# Patient Record
Sex: FEMALE | Race: WHITE | Employment: FULL TIME | URBAN - NONMETROPOLITAN AREA
[De-identification: names, ages, dates, MRNs, and addresses within clinical notes are randomized per-mention and may not be internally consistent; named-entity substitution may affect disease eponyms.]

---

## 2017-08-15 ENCOUNTER — HOSPITAL ENCOUNTER (OUTPATIENT)
Dept: ULTRASOUND IMAGING | Age: 27
Discharge: HOME | End: 2017-08-15
Attending: FAMILY MEDICINE | Admitting: FAMILY MEDICINE

## 2017-08-15 DIAGNOSIS — R10.9 ABDOMINAL PAIN, UNSPECIFIED LOCATION: ICD-10-CM

## 2017-09-13 ENCOUNTER — HOSPITAL ENCOUNTER (OUTPATIENT)
Dept: NUCLEAR MEDICINE | Age: 27
Discharge: HOME | End: 2017-09-13
Attending: FAMILY MEDICINE | Admitting: FAMILY MEDICINE

## 2017-09-13 DIAGNOSIS — R10.9 ABDOMINAL PAIN, UNSPECIFIED SITE: ICD-10-CM

## 2017-09-13 DIAGNOSIS — R10.9 ABDOMINAL PAIN: ICD-10-CM

## 2017-09-13 RX ADMIN — Medication 6 MILLICURIE: at 12:46

## 2019-01-02 ENCOUNTER — HOSPITAL ENCOUNTER (EMERGENCY)
Age: 29
Discharge: HOME | End: 2019-01-02

## 2019-01-02 VITALS
RESPIRATION RATE: 16 BRPM | SYSTOLIC BLOOD PRESSURE: 147 MMHG | WEIGHT: 209 LBS | HEART RATE: 77 BPM | OXYGEN SATURATION: 100 % | TEMPERATURE: 97.9 F | BODY MASS INDEX: 37.03 KG/M2 | HEIGHT: 63 IN | DIASTOLIC BLOOD PRESSURE: 97 MMHG

## 2019-01-02 DIAGNOSIS — G44.209 ACUTE NON INTRACTABLE TENSION-TYPE HEADACHE: Primary | ICD-10-CM

## 2019-01-02 DIAGNOSIS — R03.0 ELEVATED BLOOD PRESSURE READING: ICD-10-CM

## 2019-01-02 PROCEDURE — 96374 THER/PROPH/DIAG INJ IV PUSH: CPT

## 2019-01-02 PROCEDURE — 2580000003 HC RX 258: Performed by: PHYSICIAN ASSISTANT

## 2019-01-02 PROCEDURE — 96361 HYDRATE IV INFUSION ADD-ON: CPT

## 2019-01-02 PROCEDURE — 6360000002 HC RX W HCPCS: Performed by: PHYSICIAN ASSISTANT

## 2019-01-02 PROCEDURE — 96375 TX/PRO/DX INJ NEW DRUG ADDON: CPT

## 2019-01-02 PROCEDURE — 99283 EMERGENCY DEPT VISIT LOW MDM: CPT

## 2019-01-02 PROCEDURE — 6370000000 HC RX 637 (ALT 250 FOR IP): Performed by: PHYSICIAN ASSISTANT

## 2019-01-02 RX ORDER — 0.9 % SODIUM CHLORIDE 0.9 %
1000 INTRAVENOUS SOLUTION INTRAVENOUS ONCE
Status: COMPLETED | OUTPATIENT
Start: 2019-01-02 | End: 2019-01-02

## 2019-01-02 RX ORDER — METOCLOPRAMIDE HYDROCHLORIDE 5 MG/ML
10 INJECTION INTRAMUSCULAR; INTRAVENOUS ONCE
Status: COMPLETED | OUTPATIENT
Start: 2019-01-02 | End: 2019-01-02

## 2019-01-02 RX ORDER — DIPHENHYDRAMINE HCL 25 MG
50 TABLET ORAL ONCE
Status: COMPLETED | OUTPATIENT
Start: 2019-01-02 | End: 2019-01-02

## 2019-01-02 RX ORDER — KETOROLAC TROMETHAMINE 30 MG/ML
30 INJECTION, SOLUTION INTRAMUSCULAR; INTRAVENOUS ONCE
Status: COMPLETED | OUTPATIENT
Start: 2019-01-02 | End: 2019-01-02

## 2019-01-02 RX ORDER — BUTALBITAL, ASPIRIN, AND CAFFEINE 50; 325; 40 MG/1; MG/1; MG/1
1 CAPSULE ORAL EVERY 6 HOURS PRN
Qty: 20 CAPSULE | Refills: 0 | Status: SHIPPED | OUTPATIENT
Start: 2019-01-02 | End: 2022-08-01

## 2019-01-02 RX ADMIN — SODIUM CHLORIDE 1000 ML: 9 INJECTION, SOLUTION INTRAVENOUS at 14:31

## 2019-01-02 RX ADMIN — KETOROLAC TROMETHAMINE 30 MG: 30 INJECTION, SOLUTION INTRAMUSCULAR; INTRAVENOUS at 14:31

## 2019-01-02 RX ADMIN — METOCLOPRAMIDE 10 MG: 5 INJECTION, SOLUTION INTRAMUSCULAR; INTRAVENOUS at 14:32

## 2019-01-02 RX ADMIN — DIPHENHYDRAMINE HCL 50 MG: 25 TABLET ORAL at 14:31

## 2019-01-02 ASSESSMENT — PAIN DESCRIPTION - LOCATION: LOCATION: HEAD

## 2019-01-02 ASSESSMENT — PAIN DESCRIPTION - PAIN TYPE: TYPE: ACUTE PAIN

## 2019-01-02 ASSESSMENT — PAIN SCALES - GENERAL: PAINLEVEL_OUTOF10: 7

## 2019-11-05 ENCOUNTER — APPOINTMENT (OUTPATIENT)
Dept: ULTRASOUND IMAGING | Age: 29
End: 2019-11-05
Payer: MEDICAID

## 2019-11-05 ENCOUNTER — HOSPITAL ENCOUNTER (EMERGENCY)
Age: 29
Discharge: HOME | End: 2019-11-05
Attending: EMERGENCY MEDICINE
Payer: MEDICAID

## 2019-11-05 VITALS
DIASTOLIC BLOOD PRESSURE: 93 MMHG | WEIGHT: 215 LBS | TEMPERATURE: 98.7 F | HEART RATE: 92 BPM | RESPIRATION RATE: 15 BRPM | HEIGHT: 63 IN | BODY MASS INDEX: 38.09 KG/M2 | OXYGEN SATURATION: 100 % | SYSTOLIC BLOOD PRESSURE: 157 MMHG

## 2019-11-05 DIAGNOSIS — R10.9 ABDOMINAL PAIN DURING PREGNANCY IN FIRST TRIMESTER: Primary | ICD-10-CM

## 2019-11-05 DIAGNOSIS — O26.891 ABDOMINAL PAIN DURING PREGNANCY IN FIRST TRIMESTER: Primary | ICD-10-CM

## 2019-11-05 LAB
ABO/RH: NORMAL
ALBUMIN SERPL-MCNC: 4.1 GM/DL (ref 3.4–5)
ALP BLD-CCNC: 101 IU/L (ref 40–129)
ALT SERPL-CCNC: 14 U/L (ref 10–40)
ANION GAP SERPL CALCULATED.3IONS-SCNC: 10 MMOL/L (ref 4–16)
AST SERPL-CCNC: 13 IU/L (ref 15–37)
BACTERIA: ABNORMAL /HPF
BASOPHILS ABSOLUTE: 0.1 K/CU MM
BASOPHILS RELATIVE PERCENT: 0.7 % (ref 0–1)
BILIRUB SERPL-MCNC: 0.7 MG/DL (ref 0–1)
BILIRUBIN URINE: NEGATIVE MG/DL
BLOOD, URINE: NEGATIVE
BUN BLDV-MCNC: 10 MG/DL (ref 6–23)
CALCIUM SERPL-MCNC: 9.3 MG/DL (ref 8.3–10.6)
CAST TYPE: ABNORMAL /HPF
CHLORIDE BLD-SCNC: 104 MMOL/L (ref 99–110)
CLARITY: ABNORMAL
CO2: 23 MMOL/L (ref 21–32)
COLOR: YELLOW
CREAT SERPL-MCNC: 0.7 MG/DL (ref 0.6–1.1)
CRYSTAL TYPE: ABNORMAL /HPF
DIFFERENTIAL TYPE: ABNORMAL
EOSINOPHILS ABSOLUTE: 0 K/CU MM
EOSINOPHILS RELATIVE PERCENT: 0.6 % (ref 0–3)
EPITHELIAL CELLS, UA: ABNORMAL /HPF
GFR AFRICAN AMERICAN: >60 ML/MIN/1.73M2
GFR NON-AFRICAN AMERICAN: >60 ML/MIN/1.73M2
GLUCOSE BLD-MCNC: 89 MG/DL (ref 70–99)
GLUCOSE, URINE: NEGATIVE MG/DL
GONADOTROPIN, CHORIONIC (HCG) QUANT: NORMAL UIU/ML
HCT VFR BLD CALC: 38.1 % (ref 37–47)
HEMOGLOBIN: 12.2 GM/DL (ref 12.5–16)
IMMATURE NEUTROPHIL %: 0.1 % (ref 0–0.43)
KETONES, URINE: ABNORMAL MG/DL
LEUKOCYTE ESTERASE, URINE: ABNORMAL
LIPASE: 11 IU/L (ref 13–60)
LYMPHOCYTES ABSOLUTE: 1.4 K/CU MM
LYMPHOCYTES RELATIVE PERCENT: 21.4 % (ref 24–44)
Lab: ABNORMAL
Lab: NORMAL
MCH RBC QN AUTO: 28.6 PG (ref 27–31)
MCHC RBC AUTO-ENTMCNC: 32 % (ref 32–36)
MCV RBC AUTO: 89.2 FL (ref 78–100)
MONOCYTES ABSOLUTE: 0.5 K/CU MM
MONOCYTES RELATIVE PERCENT: 8 % (ref 0–4)
NITRITE URINE, QUANTITATIVE: NEGATIVE
PDW BLD-RTO: 13.2 % (ref 11.7–14.9)
PH, URINE: 6.5 (ref 5–8)
PLATELET # BLD: 281 K/CU MM (ref 140–440)
PMV BLD AUTO: 10.1 FL (ref 7.5–11.1)
POTASSIUM SERPL-SCNC: 3.6 MMOL/L (ref 3.5–5.1)
PROTEIN UA: NEGATIVE MG/DL
RBC # BLD: 4.27 M/CU MM (ref 4.2–5.4)
RBC URINE: ABNORMAL /HPF (ref 0–6)
SEGMENTED NEUTROPHILS ABSOLUTE COUNT: 4.7 K/CU MM
SEGMENTED NEUTROPHILS RELATIVE PERCENT: 69.2 % (ref 36–66)
SODIUM BLD-SCNC: 137 MMOL/L (ref 135–145)
SPECIFIC GRAVITY UA: 1.02 (ref 1–1.03)
SPECIMEN: ABNORMAL
SPECIMEN: NORMAL
TOTAL IMMATURE NEUTOROPHIL: 0.01 K/CU MM
TOTAL PROTEIN: 7.5 GM/DL (ref 6.4–8.2)
UROBILINOGEN, URINE: 0.2 MG/DL (ref 0.2–1)
WBC # BLD: 6.7 K/CU MM (ref 4–10.5)
WBC UA: ABNORMAL /HPF (ref 0–5)
WET PREP: ABNORMAL
WET PREP: NORMAL

## 2019-11-05 PROCEDURE — 76817 TRANSVAGINAL US OBSTETRIC: CPT

## 2019-11-05 PROCEDURE — 87491 CHLMYD TRACH DNA AMP PROBE: CPT

## 2019-11-05 PROCEDURE — 86901 BLOOD TYPING SEROLOGIC RH(D): CPT

## 2019-11-05 PROCEDURE — 84702 CHORIONIC GONADOTROPIN TEST: CPT

## 2019-11-05 PROCEDURE — 87086 URINE CULTURE/COLONY COUNT: CPT

## 2019-11-05 PROCEDURE — 93975 VASCULAR STUDY: CPT

## 2019-11-05 PROCEDURE — 87220 TISSUE EXAM FOR FUNGI: CPT

## 2019-11-05 PROCEDURE — 81001 URINALYSIS AUTO W/SCOPE: CPT

## 2019-11-05 PROCEDURE — 6370000000 HC RX 637 (ALT 250 FOR IP): Performed by: EMERGENCY MEDICINE

## 2019-11-05 PROCEDURE — 80053 COMPREHEN METABOLIC PANEL: CPT

## 2019-11-05 PROCEDURE — 86900 BLOOD TYPING SEROLOGIC ABO: CPT

## 2019-11-05 PROCEDURE — 85025 COMPLETE CBC W/AUTO DIFF WBC: CPT

## 2019-11-05 PROCEDURE — 83690 ASSAY OF LIPASE: CPT

## 2019-11-05 PROCEDURE — 99284 EMERGENCY DEPT VISIT MOD MDM: CPT

## 2019-11-05 PROCEDURE — 87591 N.GONORRHOEAE DNA AMP PROB: CPT

## 2019-11-05 RX ORDER — ACETAMINOPHEN 500 MG
1000 TABLET ORAL EVERY 6 HOURS PRN
Qty: 60 TABLET | Refills: 0 | Status: SHIPPED | OUTPATIENT
Start: 2019-11-05 | End: 2022-08-01

## 2019-11-05 RX ORDER — ACETAMINOPHEN 325 MG/1
975 TABLET ORAL ONCE
Status: COMPLETED | OUTPATIENT
Start: 2019-11-05 | End: 2019-11-05

## 2019-11-05 RX ADMIN — ACETAMINOPHEN 975 MG: 325 TABLET ORAL at 15:58

## 2019-11-05 ASSESSMENT — ENCOUNTER SYMPTOMS
BACK PAIN: 1
VOMITING: 0
SHORTNESS OF BREATH: 0
NAUSEA: 0
DIARRHEA: 0
ABDOMINAL PAIN: 1

## 2019-11-05 ASSESSMENT — PAIN DESCRIPTION - ORIENTATION: ORIENTATION: LOWER

## 2019-11-05 ASSESSMENT — PAIN DESCRIPTION - LOCATION: LOCATION: ABDOMEN

## 2019-11-05 ASSESSMENT — PAIN DESCRIPTION - PAIN TYPE: TYPE: ACUTE PAIN

## 2019-11-05 ASSESSMENT — PAIN SCALES - GENERAL
PAINLEVEL_OUTOF10: 6
PAINLEVEL_OUTOF10: 6

## 2019-11-07 LAB
CHLAMYDIA TRACHOMATIS AMPLIFIED DET: NEGATIVE
CHLAMYDIA TRACHOMATIS AMPLIFIED DET: NORMAL
CULTURE: ABNORMAL
Lab: ABNORMAL
N GONORRHOEAE AMPLIFIED DET: NEGATIVE
N GONORRHOEAE AMPLIFIED DET: NORMAL
SPECIMEN: ABNORMAL
TOTAL COLONY COUNT: ABNORMAL

## 2019-12-31 ENCOUNTER — APPOINTMENT (OUTPATIENT)
Dept: ULTRASOUND IMAGING | Age: 29
End: 2019-12-31
Payer: MEDICARE

## 2019-12-31 ENCOUNTER — HOSPITAL ENCOUNTER (EMERGENCY)
Age: 29
Discharge: HOME | End: 2019-12-31
Payer: MEDICARE

## 2019-12-31 VITALS
WEIGHT: 208 LBS | HEART RATE: 74 BPM | OXYGEN SATURATION: 100 % | SYSTOLIC BLOOD PRESSURE: 120 MMHG | BODY MASS INDEX: 36.86 KG/M2 | TEMPERATURE: 97.5 F | DIASTOLIC BLOOD PRESSURE: 85 MMHG | HEIGHT: 63 IN | RESPIRATION RATE: 16 BRPM

## 2019-12-31 LAB
ALBUMIN SERPL-MCNC: 3.8 GM/DL (ref 3.4–5)
ALP BLD-CCNC: 88 IU/L (ref 40–129)
ALT SERPL-CCNC: 11 U/L (ref 10–40)
ANION GAP SERPL CALCULATED.3IONS-SCNC: 12 MMOL/L (ref 4–16)
AST SERPL-CCNC: 12 IU/L (ref 15–37)
BACTERIA: ABNORMAL /HPF
BASOPHILS ABSOLUTE: 0.1 K/CU MM
BASOPHILS RELATIVE PERCENT: 0.5 % (ref 0–1)
BILIRUB SERPL-MCNC: 0.4 MG/DL (ref 0–1)
BILIRUBIN URINE: NEGATIVE MG/DL
BLOOD, URINE: NEGATIVE
BUN BLDV-MCNC: 12 MG/DL (ref 6–23)
CALCIUM SERPL-MCNC: 9.1 MG/DL (ref 8.3–10.6)
CHLORIDE BLD-SCNC: 100 MMOL/L (ref 99–110)
CLARITY: ABNORMAL
CO2: 21 MMOL/L (ref 21–32)
COLOR: YELLOW
CREAT SERPL-MCNC: 0.6 MG/DL (ref 0.6–1.1)
DIFFERENTIAL TYPE: ABNORMAL
EOSINOPHILS ABSOLUTE: 0.1 K/CU MM
EOSINOPHILS RELATIVE PERCENT: 0.9 % (ref 0–3)
GFR AFRICAN AMERICAN: >60 ML/MIN/1.73M2
GFR NON-AFRICAN AMERICAN: >60 ML/MIN/1.73M2
GLUCOSE BLD-MCNC: 104 MG/DL (ref 70–99)
GLUCOSE, URINE: NEGATIVE MG/DL
GONADOTROPIN, CHORIONIC (HCG) QUANT: NORMAL UIU/ML
HCT VFR BLD CALC: 41.6 % (ref 37–47)
HEMOGLOBIN: 13.6 GM/DL (ref 12.5–16)
IMMATURE NEUTROPHIL %: 0.2 % (ref 0–0.43)
KETONES, URINE: NEGATIVE MG/DL
LEUKOCYTE ESTERASE, URINE: ABNORMAL
LIPASE: 16 IU/L (ref 13–60)
LYMPHOCYTES ABSOLUTE: 1.7 K/CU MM
LYMPHOCYTES RELATIVE PERCENT: 17.2 % (ref 24–44)
MCH RBC QN AUTO: 29.4 PG (ref 27–31)
MCHC RBC AUTO-ENTMCNC: 32.7 % (ref 32–36)
MCV RBC AUTO: 89.8 FL (ref 78–100)
MONOCYTES ABSOLUTE: 0.5 K/CU MM
MONOCYTES RELATIVE PERCENT: 5.3 % (ref 0–4)
MUCUS: ABNORMAL HPF
NITRITE URINE, QUANTITATIVE: NEGATIVE
NUCLEATED RBC %: 0 %
PDW BLD-RTO: 13.2 % (ref 11.7–14.9)
PH, URINE: 6 (ref 5–8)
PLATELET # BLD: 249 K/CU MM (ref 140–440)
PMV BLD AUTO: 10.7 FL (ref 7.5–11.1)
POTASSIUM SERPL-SCNC: 3.8 MMOL/L (ref 3.5–5.1)
PROTEIN UA: NEGATIVE MG/DL
RBC # BLD: 4.63 M/CU MM (ref 4.2–5.4)
RBC URINE: ABNORMAL /HPF (ref 0–6)
SEGMENTED NEUTROPHILS ABSOLUTE COUNT: 7.7 K/CU MM
SEGMENTED NEUTROPHILS RELATIVE PERCENT: 75.9 % (ref 36–66)
SODIUM BLD-SCNC: 133 MMOL/L (ref 135–145)
SPECIFIC GRAVITY UA: 1.03 (ref 1–1.03)
SQUAMOUS EPITHELIAL: 3 /HPF
TOTAL IMMATURE NEUTOROPHIL: 0.02 K/CU MM
TOTAL NUCLEATED RBC: 0 K/CU MM
TOTAL PROTEIN: 7.5 GM/DL (ref 6.4–8.2)
TRICHOMONAS: ABNORMAL /HPF
UROBILINOGEN, URINE: 2 MG/DL (ref 0.2–1)
WBC # BLD: 10.1 K/CU MM (ref 4–10.5)
WBC UA: 34 /HPF (ref 0–5)

## 2019-12-31 PROCEDURE — 83690 ASSAY OF LIPASE: CPT

## 2019-12-31 PROCEDURE — 84702 CHORIONIC GONADOTROPIN TEST: CPT

## 2019-12-31 PROCEDURE — 80053 COMPREHEN METABOLIC PANEL: CPT

## 2019-12-31 PROCEDURE — 81001 URINALYSIS AUTO W/SCOPE: CPT

## 2019-12-31 PROCEDURE — 99284 EMERGENCY DEPT VISIT MOD MDM: CPT

## 2019-12-31 PROCEDURE — 6370000000 HC RX 637 (ALT 250 FOR IP): Performed by: PHYSICIAN ASSISTANT

## 2019-12-31 PROCEDURE — 86900 BLOOD TYPING SEROLOGIC ABO: CPT

## 2019-12-31 PROCEDURE — 76801 OB US < 14 WKS SINGLE FETUS: CPT

## 2019-12-31 PROCEDURE — 93975 VASCULAR STUDY: CPT

## 2019-12-31 PROCEDURE — 36415 COLL VENOUS BLD VENIPUNCTURE: CPT

## 2019-12-31 PROCEDURE — 85025 COMPLETE CBC W/AUTO DIFF WBC: CPT

## 2019-12-31 PROCEDURE — 87086 URINE CULTURE/COLONY COUNT: CPT

## 2019-12-31 RX ORDER — CEPHALEXIN 500 MG/1
500 CAPSULE ORAL 2 TIMES DAILY
Qty: 14 CAPSULE | Refills: 0 | Status: SHIPPED | OUTPATIENT
Start: 2019-12-31 | End: 2020-01-07

## 2019-12-31 RX ORDER — ACETAMINOPHEN 500 MG
1000 TABLET ORAL ONCE
Status: COMPLETED | OUTPATIENT
Start: 2019-12-31 | End: 2019-12-31

## 2019-12-31 RX ORDER — CEPHALEXIN 250 MG/1
500 CAPSULE ORAL ONCE
Status: COMPLETED | OUTPATIENT
Start: 2019-12-31 | End: 2019-12-31

## 2019-12-31 RX ADMIN — ACETAMINOPHEN 1000 MG: 500 TABLET ORAL at 16:23

## 2019-12-31 RX ADMIN — CEPHALEXIN 500 MG: 250 CAPSULE ORAL at 19:27

## 2019-12-31 ASSESSMENT — PAIN DESCRIPTION - FREQUENCY: FREQUENCY: CONTINUOUS

## 2019-12-31 ASSESSMENT — PAIN DESCRIPTION - DESCRIPTORS: DESCRIPTORS: PRESSURE

## 2019-12-31 ASSESSMENT — PAIN DESCRIPTION - ORIENTATION
ORIENTATION: LOWER
ORIENTATION: LEFT

## 2019-12-31 ASSESSMENT — PAIN SCALES - GENERAL
PAINLEVEL_OUTOF10: 5
PAINLEVEL_OUTOF10: 5
PAINLEVEL_OUTOF10: 6

## 2019-12-31 ASSESSMENT — PAIN DESCRIPTION - ONSET: ONSET: ON-GOING

## 2019-12-31 ASSESSMENT — PAIN DESCRIPTION - PAIN TYPE
TYPE: ACUTE PAIN
TYPE: ACUTE PAIN

## 2019-12-31 ASSESSMENT — PAIN DESCRIPTION - LOCATION
LOCATION: ABDOMEN
LOCATION: ABDOMEN

## 2019-12-31 NOTE — ED PROVIDER NOTES
Shivam      PCP: Le Mcguire MD    [address] [facility]    Chief Complaint   Patient presents with    Abdominal Pain     13 weeks preg, turned into wooden stand and hit stomach yesterday, \"stabbing\" pains to left side        This patient was not evaluated by the attending physician. I have independently evaluated this patient. HPI    Venita Youngblood is a 30 y.o. female who presents with left-sided abdominal pain. Onset yesterday. Patient states she was at work and when she turned and accidentally ran into a stand hitting left side of abdomen. Patient has had stabbing pain to left side of abdomen and pressure in lower abdomen. Patient is approximately 13 weeks pregnant. Patient denies any hip pain, rib pain, midline spine pain. Patient denies chest pain or shortness of breath. Patient denies any vaginal symptoms. Patient follows with OB at the [facility]. Patient has not taken anything for the pain. REVIEW OF SYSTEMS    Constitutional:  Denies fever  HENT:  Denies sore throat or ear pain   Cardiovascular:  Denies chest pain  Respiratory:  Denies shortness of breath   GI:  See HPI above  : No hematuria or dysuria. No vaginal symptoms. Musculoskeletal:  No pain or swelling of extremities. Skin:  Denies rash  Neurologic:  Denies headache, focal weakness or sensory changes   Endocrine:  Denies polyuria or polydypsia   Lymphatic:  Denies swollen glands     All other review of systems are negative  See HPI and nursing notes for additional information     PAST MEDICAL & SURGICAL HISTORY    Past Medical History:   Diagnosis Date    GERD (gastroesophageal reflux disease)     Migraine      History reviewed. No pertinent surgical history.     CURRENT MEDICATIONS    Current Outpatient Rx   Medication Sig Dispense Refill    cephALEXin (KEFLEX) 500 MG capsule Take 1 capsule by mouth 2 times daily for 7 days 14 capsule 0    acetaminophen (TYLENOL) 500 MG tablet Take 2 tablets Intimate partner violence:     Fear of current or ex partner: None     Emotionally abused: None     Physically abused: None     Forced sexual activity: None   Other Topics Concern    None   Social History Narrative    None     History reviewed. No pertinent family history. PHYSICAL EXAM    VITAL SIGNS: /85   Pulse 74   Temp 97.5 °F (36.4 °C) (Oral)   Resp 16   Ht 5' 3\" (1.6 m)   Wt 208 lb (94.3 kg)   LMP 09/14/2019   SpO2 100%   BMI 36.85 kg/m²   Constitutional:  Well developed, well nourished  Eyes:  Sclera nonicteric, conjunctiva moist  HENT:  Atraumatic. PERRL. EOMI.  moist mucus membranes. Neck/Lymphatics: supple, no JVD, no swollen nodes  Respiratory:  No retractions, no accessory muscle use, normal breath sounds   Cardiovascular:  normal rate, normal rhythm, no murmurs    GI:    Minimal quarter size area of ecchymosis to left lateral abdomen, flank region. Bowel sounds present, no audible bruits. Soft,  no guarding,   + Mild left-sided and suprapubic abdominal tenderness, no rebound, no palpable pulsatile masses,   No McBurney's point tenderness   Negative Rovsing sign    Negative Biggs's sign. Back:  No CVA tenderness to percussion.   Musculoskeletal:  No edema, no deformity  Integument: No rash, dry skin  Neurologic:  Alert & oriented, normal speech  Psychiatric: Cooperative, pleasant affect       LABS:  Results for orders placed or performed during the hospital encounter of 12/31/19   CBC Auto Differential   Result Value Ref Range    WBC 10.1 4.0 - 10.5 K/CU MM    RBC 4.63 4.2 - 5.4 M/CU MM    Hemoglobin 13.6 12.5 - 16.0 GM/DL    Hematocrit 41.6 37 - 47 %    MCV 89.8 78 - 100 FL    MCH 29.4 27 - 31 PG    MCHC 32.7 32.0 - 36.0 %    RDW 13.2 11.7 - 14.9 %    Platelets 164 579 - 503 K/CU MM    MPV 10.7 7.5 - 11.1 FL    Differential Type AUTOMATED DIFFERENTIAL     Segs Relative 75.9 (H) 36 - 66 %    Lymphocytes % 17.2 (L) 24 - 44 %    Monocytes % 5.3 (H) 0 - 4 %    Eosinophils % 0.9 0 department immediately if new or worsening symptoms develop. Comment: Please note this report has been produced using speech recognition software and may contain errors related to that system including errors in grammar, punctuation, and spelling, as well as words and phrases that may be inappropriate. If there are any questions or concerns please feel free to contact the dictating provider for clarification.       Andreea Turcios PA-C  12/31/19 1949

## 2019-12-31 NOTE — ED TRIAGE NOTES
Pt presents to ED with c/o of \"stabbing\" pain to left side of abd after walking into a wooden cabinet yesterday, pt 13 weeks pregnant

## 2020-01-02 LAB
CULTURE: ABNORMAL
Lab: ABNORMAL
SPECIMEN: ABNORMAL
TOTAL COLONY COUNT: ABNORMAL

## 2020-01-13 ENCOUNTER — APPOINTMENT (OUTPATIENT)
Dept: ULTRASOUND IMAGING | Age: 30
End: 2020-01-13
Payer: MEDICAID

## 2020-01-13 ENCOUNTER — HOSPITAL ENCOUNTER (EMERGENCY)
Age: 30
Discharge: HOME | End: 2020-01-13
Payer: MEDICAID

## 2020-01-13 VITALS
DIASTOLIC BLOOD PRESSURE: 71 MMHG | RESPIRATION RATE: 17 BRPM | TEMPERATURE: 98.4 F | SYSTOLIC BLOOD PRESSURE: 104 MMHG | HEART RATE: 69 BPM | OXYGEN SATURATION: 99 %

## 2020-01-13 LAB
ABO/RH: NORMAL
ALBUMIN SERPL-MCNC: 3.2 GM/DL (ref 3.4–5)
ALP BLD-CCNC: 64 IU/L (ref 40–129)
ALT SERPL-CCNC: 11 U/L (ref 10–40)
ANION GAP SERPL CALCULATED.3IONS-SCNC: 11 MMOL/L (ref 4–16)
AST SERPL-CCNC: 11 IU/L (ref 15–37)
BACTERIA: NEGATIVE /HPF
BASOPHILS ABSOLUTE: 0.1 K/CU MM
BASOPHILS RELATIVE PERCENT: 0.7 % (ref 0–1)
BILIRUB SERPL-MCNC: 0.3 MG/DL (ref 0–1)
BILIRUBIN URINE: NEGATIVE MG/DL
BLOOD, URINE: ABNORMAL
BUN BLDV-MCNC: 10 MG/DL (ref 6–23)
CALCIUM SERPL-MCNC: 8.2 MG/DL (ref 8.3–10.6)
CHLORIDE BLD-SCNC: 101 MMOL/L (ref 99–110)
CLARITY: ABNORMAL
CO2: 19 MMOL/L (ref 21–32)
COLOR: ABNORMAL
CREAT SERPL-MCNC: 0.6 MG/DL (ref 0.6–1.1)
DIFFERENTIAL TYPE: ABNORMAL
EOSINOPHILS ABSOLUTE: 0.1 K/CU MM
EOSINOPHILS RELATIVE PERCENT: 1 % (ref 0–3)
GFR AFRICAN AMERICAN: >60 ML/MIN/1.73M2
GFR NON-AFRICAN AMERICAN: >60 ML/MIN/1.73M2
GLUCOSE BLD-MCNC: 85 MG/DL (ref 70–99)
GLUCOSE, URINE: NEGATIVE MG/DL
GONADOTROPIN, CHORIONIC (HCG) QUANT: NORMAL UIU/ML
HCT VFR BLD CALC: 36.5 % (ref 37–47)
HEMOGLOBIN: 12.2 GM/DL (ref 12.5–16)
IMMATURE NEUTROPHIL %: 0.4 % (ref 0–0.43)
KETONES, URINE: NEGATIVE MG/DL
LEUKOCYTE ESTERASE, URINE: ABNORMAL
LYMPHOCYTES ABSOLUTE: 1.7 K/CU MM
LYMPHOCYTES RELATIVE PERCENT: 25 % (ref 24–44)
MCH RBC QN AUTO: 30 PG (ref 27–31)
MCHC RBC AUTO-ENTMCNC: 33.4 % (ref 32–36)
MCV RBC AUTO: 89.9 FL (ref 78–100)
MONOCYTES ABSOLUTE: 0.4 K/CU MM
MONOCYTES RELATIVE PERCENT: 6.2 % (ref 0–4)
MUCUS: ABNORMAL HPF
NITRITE URINE, QUANTITATIVE: NEGATIVE
NUCLEATED RBC %: 0 %
PDW BLD-RTO: 13.2 % (ref 11.7–14.9)
PH, URINE: 6 (ref 5–8)
PLATELET # BLD: 229 K/CU MM (ref 140–440)
PMV BLD AUTO: 10.6 FL (ref 7.5–11.1)
POTASSIUM SERPL-SCNC: 3.8 MMOL/L (ref 3.5–5.1)
PROTEIN UA: 30 MG/DL
RBC # BLD: 4.06 M/CU MM (ref 4.2–5.4)
RBC URINE: 46 /HPF (ref 0–6)
SEGMENTED NEUTROPHILS ABSOLUTE COUNT: 4.5 K/CU MM
SEGMENTED NEUTROPHILS RELATIVE PERCENT: 66.7 % (ref 36–66)
SODIUM BLD-SCNC: 131 MMOL/L (ref 135–145)
SPECIFIC GRAVITY UA: 1.02 (ref 1–1.03)
SQUAMOUS EPITHELIAL: 14 /HPF
TOTAL IMMATURE NEUTOROPHIL: 0.03 K/CU MM
TOTAL NUCLEATED RBC: 0 K/CU MM
TOTAL PROTEIN: 6.3 GM/DL (ref 6.4–8.2)
TRICHOMONAS: ABNORMAL /HPF
UROBILINOGEN, URINE: NORMAL MG/DL (ref 0.2–1)
WBC # BLD: 6.8 K/CU MM (ref 4–10.5)
WBC UA: 67 /HPF (ref 0–5)

## 2020-01-13 PROCEDURE — 86901 BLOOD TYPING SEROLOGIC RH(D): CPT

## 2020-01-13 PROCEDURE — 76805 OB US >/= 14 WKS SNGL FETUS: CPT

## 2020-01-13 PROCEDURE — 85025 COMPLETE CBC W/AUTO DIFF WBC: CPT

## 2020-01-13 PROCEDURE — 99284 EMERGENCY DEPT VISIT MOD MDM: CPT

## 2020-01-13 PROCEDURE — 84702 CHORIONIC GONADOTROPIN TEST: CPT

## 2020-01-13 PROCEDURE — 87086 URINE CULTURE/COLONY COUNT: CPT

## 2020-01-13 PROCEDURE — 86900 BLOOD TYPING SEROLOGIC ABO: CPT

## 2020-01-13 PROCEDURE — 81001 URINALYSIS AUTO W/SCOPE: CPT

## 2020-01-13 PROCEDURE — 80053 COMPREHEN METABOLIC PANEL: CPT

## 2020-01-13 PROCEDURE — 93975 VASCULAR STUDY: CPT

## 2020-01-13 ASSESSMENT — PAIN DESCRIPTION - PAIN TYPE: TYPE: ACUTE PAIN

## 2020-01-13 ASSESSMENT — PAIN DESCRIPTION - DESCRIPTORS: DESCRIPTORS: CONSTANT;SHARP;STABBING

## 2020-01-13 ASSESSMENT — PAIN SCALES - GENERAL: PAINLEVEL_OUTOF10: 6

## 2020-01-13 ASSESSMENT — PAIN DESCRIPTION - LOCATION: LOCATION: ABDOMEN

## 2020-01-13 NOTE — ED PROVIDER NOTES
eMERGENCY dEPARTMENT eNCOUnter         [id number]     PCP: Marian Ybarra MD    CHIEF COMPLAINT    Chief Complaint   Patient presents with    Abdominal Pain    Vaginal Bleeding       HPI    Bigg Rowell is a 30 y.o. female who presents with abdominal pain and vaginal bleeding in pregnancy. Onset was prior to arrival at 9 PM last night. Context is patient states that she is approximately 15 weeks pregnant. [facility] has had normal prenatal care. G4, P0 with history of 3 spontaneous miscarriages in first trimester at about 8 weeks. Patient states that she began having sharp cramping searing pain beginning in the right lower abdomen and radiating across the lower abdomen to the left lower back. Pain has been waxing and waning intensity without nausea vomiting or diarrhea. No fever or chills. Pain worsened this morning and began noticing light pink \"spotting\" with wiping only. No passage of clots. Denying any dizziness or lightheadedness. No other abnormal vaginal discharge. No sexual intercourse or trauma prior to onset of bleeding. Denying any fever or chills. No other history of GI disorders or previous abdominal surgeries. No anticoagulation use. Pain at this time is 6/10 cramping. No dysuria or hematuria. No history of kidney stones. REVIEW OF SYSTEMS    Constitutional:  Denies fever, chills, weight loss or weakness   HENT:  Denies sore throat or ear pain   Cardiovascular:  Denies chest pain, palpitations or swelling   Respiratory:  Denies cough or shortness of breath   GI:  See HPI above  : See HPI  Musculoskeletal:  Denies back pain or groin pain or masses. No pain or swelling of extremities.   Skin:  Denies rash  Neurologic:  Denies headache, focal weakness or sensory changes   Endocrine:  Denies polyuria or polydypsia   Lymphatic:  Denies swollen glands     All other review of systems are negative  See HPI and nursing notes for additional information     PAST MEDICAL & SURGICAL HISTORY    Past Medical History:   Diagnosis Date    GERD (gastroesophageal reflux disease)     Migraine      History reviewed. No pertinent surgical history. CURRENT MEDICATIONS    Current Outpatient Rx   Medication Sig Dispense Refill    acetaminophen (TYLENOL) 500 MG tablet Take 2 tablets by mouth every 6 hours as needed for Pain 60 tablet 0    butalbital-aspirin-caffeine (FIORINAL) -40 MG per capsule Take 1 capsule by mouth every 6 hours as needed for Headaches or Migraine for up to 20 doses. . 20 capsule 0    Prenatal MV-Min-Fe Fum-FA-DHA (PRENATAL 1 PO) Take by mouth      diphenhydrAMINE (BENADRYL) 25 MG capsule Take 25 mg by mouth every 6 hours as needed for Itching      naproxen (NAPROSYN) 500 MG tablet Take 1 tablet by mouth 2 times daily as needed for Pain 20 tablet 0    ondansetron (ZOFRAN) 4 MG tablet Take 1 tablet by mouth every 8 hours as needed for Nausea 10 tablet 0    butalbital-acetaminophen-caffeine (FIORICET) -40 MG per tablet Take 1 tablet by mouth every 6 hours as needed for Headaches 10 tablet 0    ondansetron (ZOFRAN ODT) 4 MG disintegrating tablet Take 1 tablet by mouth every 8 hours as needed for Nausea or Vomiting 15 tablet 0       ALLERGIES    Allergies   Allergen Reactions    Red Dye Swelling       SOCIAL AND FAMILY HISTORY    Social History     Socioeconomic History    Marital status: Single     Spouse name: None    Number of children: None    Years of education: None    Highest education level: None   Occupational History    None   Social Needs    Financial resource strain: None    Food insecurity:     Worry: None     Inability: None    Transportation needs:     Medical: None     Non-medical: None   Tobacco Use    Smoking status: Never Smoker    Smokeless tobacco: Never Used   Substance and Sexual Activity    Alcohol use: No    Drug use: No    Sexual activity: Yes     Partners: Male   Lifestyle    Physical activity:     Days per week: None     Minutes per session: None    Stress: None   Relationships    Social connections:     Talks on phone: None     Gets together: None     Attends Restoration service: None     Active member of club or organization: None     Attends meetings of clubs or organizations: None     Relationship status: None    Intimate partner violence:     Fear of current or ex partner: None     Emotionally abused: None     Physically abused: None     Forced sexual activity: None   Other Topics Concern    None   Social History Narrative    None     History reviewed. No pertinent family history. PHYSICAL EXAM    VITAL SIGNS: /71   Pulse 69   Temp 98.4 °F (36.9 °C) (Oral)   Resp 17   LMP 09/14/2019   SpO2 99%   General:  Well developed, well nourished, In no acute distress  Eyes:  Sclera nonicteric, Conjunctiva moist, No discharge  Head:  Normocephalic, Atramautic  Neck/Lymphatics: Supple, no JVD, no swollen nodes  Respiratory:  Clear to ausculation bilaterally, No retractions, Non labored breathing  Cardiovascular:  RRR, No murmurs/gallops/thrills  GI:   No gross discoloration. Bowel sounds present in all quadrants, No audible bruits. Soft,  Nondistended. Mild generalized lower abdominal tenderness, nonfocal without rebound tenderness or guarding, No palpable pulsatile masses or obvious hernias. No McBurney's point tenderness. Negative Rovsing sign   Negative Biggs's sign.   Back:   No CVA tenderness to percussion bilaterally  Musculoskeletal:  No edema, No deformity  Peripheral Vascular: Distal pulses 2+ equal bilaterally  Integument: No rash, Normal turgor  Neurologic:  Alert & oriented, Normal speech  Psychiatric: Cooperative, pleasant affect       I have reviewed and interpreted all of the currently available lab results from this visit (if applicable):  Results for orders placed or performed during the hospital encounter hCG Quant    10,000-100,000     5-6 WEEKS UIU/ML    hCG Quant    15,000-200,000     6-8 WEEKS UIU/ML    hCG Quant    10,000-100,000     2-3 MONTHS UIU/ML   Urinalysis, reflex to microscopic   Result Value Ref Range    Color, UA AVINASH (A) YELLOW    Clarity, UA SLIGHTLY CLOUDY (A) CLEAR    Glucose, Urine NEGATIVE NEGATIVE MG/DL    Bilirubin Urine NEGATIVE NEGATIVE MG/DL    Ketones, Urine NEGATIVE NEGATIVE MG/DL    Specific Gravity, UA 1.021 1.001 - 1.035    Blood, Urine SMALL (A) NEGATIVE    pH, Urine 6.0 5.0 - 8.0    Protein, UA 30 (A) NEGATIVE MG/DL    Urobilinogen, Urine NORMAL 0.2 - 1.0 MG/DL    Nitrite Urine, Quantitative NEGATIVE NEGATIVE    Leukocyte Esterase, Urine LARGE (A) NEGATIVE    RBC, UA 46 (H) 0 - 6 /HPF    WBC, UA 67 (H) 0 - 5 /HPF    Bacteria, UA NEGATIVE NEGATIVE /HPF    Squam Epithel, UA 14 /HPF    Mucus, UA RARE (A) NEGATIVE HPF    Trichomonas, UA NONE SEEN NONE SEEN /HPF   ABO/RH   Result Value Ref Range    ABO/Rh A POSITIVE         RADIOLOGY/PROCEDURES       US OB 14 PLUS WEEKS SINGLE OR FIRST GESTATION (Final result)   Result time 01/13/20 10:29:18   Final result by Fabian Cueva MD (01/13/20 10:29:18)                Impression:    1. A single live intrauterine pregnancy with estimated gestational age of 13  weeks, 4 days by ultrasound.  The estimated fetal weight is 125.8 g.  2. Questionable 2.1 cm fundal subchorionic hemorrhage. Narrative:    EXAMINATION:  TRANSABDOMINAL SECOND/THIRD TRIMESTER OBSTETRIC PELVIC ULTRASOUND WITH COLOR  DOPPLER FLOW; DOPPLER EVALUATION OF THE PELVIS    1/13/2020 8:18 am    TECHNIQUE:  TRANSABDOMINAL PELVIC ULTRASOUND WITH COLOR DOPPLER FLOW; DOPPLER ULTRASOUND  OF THE PELVIS    COMPARISON:  Obstetrical ultrasound 12/31/2019.     HISTORY:  ORDERING SYSTEM PROVIDED HISTORY: pelvic pain, 15 weeks, spotting  TECHNOLOGIST PROVIDED HISTORY:  Reason for exam:->pelvic pain, 15 weeks, spotting  Acuity: Acute  Type of Exam: Initial  Relevant Medical/Surgical History: 15 WKS GRAVID UTERUS, PP X 12 HRS AND  BLEEDING PINK BLOOD SINCE 500 AM    FINDINGS:    GENERAL OBSERVATIONS:    PREGNANCY:   Single    CARDIAC ACTIVITY:  Yes    FETAL HEART RATE: 130 beats per minute    FETAL BODY & LIMB MOVEMENTS:  Yes    FETAL POSITION:  Transverse    PLACENTA LOCATION:  Fundal.  At the fundal region there is an irregular 2.1 x  1.0 x 1.2 cm hypoechoic focus    ABBE:   11.97 cm (10-20 cm within normal limits)      ESTIMATED FETAL AGE:    BY LMP:  15 weeks 3 days    CURRENT US: 15 weeks, 4 days    ESTIMATED FETAL WEIGHT:   125.8 g grams, 43%tile by AUA      MEASUREMENTS:    BPD: 3.14 cm, 66%tile    HEAD CIRCUMFERENCE:  11.52 cm, 45%tile    ABD. CIRCUMFERENCE:  9.40 cm, 57%tile    FEMUR LENGTH:  1.81 cm, 42%tile    CERVICAL LENGTH:  Approximately 4 cm.                    US DUP ABD PEL RETRO SCROT COMPLETE (Final result)   Result time 01/13/20 10:29:18   Final result by Juan Alberto Recio MD (01/13/20 10:29:18)                Impression:    1. A single live intrauterine pregnancy with estimated gestational age of 13  weeks, 4 days by ultrasound.  The estimated fetal weight is 125.8 g.  2. Questionable 2.1 cm fundal subchorionic hemorrhage. Narrative:    EXAMINATION:  TRANSABDOMINAL SECOND/THIRD TRIMESTER OBSTETRIC PELVIC ULTRASOUND WITH COLOR  DOPPLER FLOW; DOPPLER EVALUATION OF THE PELVIS    1/13/2020 8:18 am    TECHNIQUE:  TRANSABDOMINAL PELVIC ULTRASOUND WITH COLOR DOPPLER FLOW; DOPPLER ULTRASOUND  OF THE PELVIS    COMPARISON:  Obstetrical ultrasound 12/31/2019.     HISTORY:  ORDERING SYSTEM PROVIDED HISTORY: pelvic pain, 15 weeks, spotting  TECHNOLOGIST PROVIDED HISTORY:  Reason for exam:->pelvic pain, 15 weeks, spotting  Acuity: Acute  Type of Exam: Initial  Relevant Medical/Surgical History: 15 WKS GRAVID UTERUS, PP X 12 HRS AND  BLEEDING PINK BLOOD SINCE 500 AM    FINDINGS:    GENERAL OBSERVATIONS:    PREGNANCY:   Single    CARDIAC ACTIVITY:  Yes    FETAL HEART RATE: 130 follow-up with OB/GYN in 1 to 2 days for recheck, sooner with worsening symptoms. I discussed  need for return to emergency department in 12-24 hours for repeat evaluation, sooner if symptoms worsen or any new symptoms develop. Comment: Please note this report has been produced using speech recognition software and may contain errors related to that system including errors in grammar, punctuation, and spelling, as well as words and phrases that may be inappropriate. If there are any questions or concerns please feel free to contact the dictating provider for clarification.           Nette Murphy PA-C  01/13/20 1042

## 2020-01-13 NOTE — LETTER
[address] [facility] Emergency Department  [address] [id number]  Phone: [phone]  Fax: [phone]             January 13, 2020    Patient: Kiara Washburn   YOB: 1990   Date of Visit: 1/13/2020       To Whom It May Concern:    Tona Peterson was seen and treated in our emergency department on 1/13/2020.  She may return to Work/School on the following date __1/14/2020__________      Sincerely,                Signature:__________________________________

## 2020-01-14 LAB
CULTURE: NORMAL
Lab: NORMAL
SPECIMEN: NORMAL

## 2020-06-19 ENCOUNTER — HOSPITAL ENCOUNTER (OUTPATIENT)
Age: 30
Discharge: HOME | End: 2020-06-19
Payer: MEDICAID

## 2020-06-19 PROCEDURE — U0002 COVID-19 LAB TEST NON-CDC: HCPCS

## 2020-06-23 LAB
SARS-COV-2: NOT DETECTED
SOURCE: NORMAL

## 2021-12-08 ENCOUNTER — HOSPITAL ENCOUNTER (EMERGENCY)
Age: 31
Discharge: HOME | End: 2021-12-09
Payer: MEDICAID

## 2021-12-08 VITALS
RESPIRATION RATE: 15 BRPM | DIASTOLIC BLOOD PRESSURE: 65 MMHG | TEMPERATURE: 98.8 F | SYSTOLIC BLOOD PRESSURE: 113 MMHG | OXYGEN SATURATION: 99 % | HEART RATE: 75 BPM

## 2021-12-08 DIAGNOSIS — S51.812A LACERATION OF LEFT FOREARM, INITIAL ENCOUNTER: Primary | ICD-10-CM

## 2021-12-08 PROCEDURE — 12001 RPR S/N/AX/GEN/TRNK 2.5CM/<: CPT

## 2021-12-08 PROCEDURE — 99283 EMERGENCY DEPT VISIT LOW MDM: CPT

## 2021-12-08 RX ORDER — LIDOCAINE HYDROCHLORIDE AND EPINEPHRINE BITARTRATE 20; .01 MG/ML; MG/ML
20 INJECTION, SOLUTION SUBCUTANEOUS ONCE
Status: COMPLETED | OUTPATIENT
Start: 2021-12-08 | End: 2021-12-09

## 2021-12-08 ASSESSMENT — PAIN SCALES - GENERAL: PAINLEVEL_OUTOF10: 4

## 2021-12-09 PROCEDURE — 90715 TDAP VACCINE 7 YRS/> IM: CPT | Performed by: PHYSICIAN ASSISTANT

## 2021-12-09 PROCEDURE — 6360000002 HC RX W HCPCS: Performed by: PHYSICIAN ASSISTANT

## 2021-12-09 PROCEDURE — 2500000003 HC RX 250 WO HCPCS: Performed by: PHYSICIAN ASSISTANT

## 2021-12-09 PROCEDURE — 90471 IMMUNIZATION ADMIN: CPT | Performed by: PHYSICIAN ASSISTANT

## 2021-12-09 RX ADMIN — LIDOCAINE HYDROCHLORIDE,EPINEPHRINE BITARTRATE 20 ML: 20; .01 INJECTION, SOLUTION INFILTRATION; PERINEURAL at 00:06

## 2021-12-09 RX ADMIN — TETANUS TOXOID, REDUCED DIPHTHERIA TOXOID AND ACELLULAR PERTUSSIS VACCINE, ADSORBED 0.5 ML: 5; 2.5; 8; 8; 2.5 SUSPENSION INTRAMUSCULAR at 00:05

## 2021-12-09 ASSESSMENT — PAIN SCALES - GENERAL: PAINLEVEL_OUTOF10: 0

## 2021-12-09 NOTE — ED TRIAGE NOTES
Pt cut her wrist on a plate just PTA. Pts left wrist is dressed with a towel, bleeding is controlled. Pt is unsure of when her last tetanus was.

## 2021-12-09 NOTE — ED PROVIDER NOTES
eMERGENCY dEPARTMENT eNCOUnter        CHIEF COMPLAINT    Chief Complaint   Patient presents with    Laceration       HPI    Brijesh Casas is a 28 y.o. female who presents with a laceration. Onset was prior to arrival.  Context was a plate fell and shattered when patient opened a cabinet, cutting her left forearm. Laceration is localized to the distal left forearm. Patient reports associated tenderness to the site. Denies numbness or tingling. Denies limitation to ROM of the arm. Patient is not up-to-date on Tetanus. REVIEW OF SYSTEMS    Musculoskeletal:  Denies edema, tenderness. Integument:  + laceration  Neurologic:  Denies any numbness or tingling. PAST MEDICAL HISTORY    Past Medical History:   Diagnosis Date    GERD (gastroesophageal reflux disease)     Migraine        SURGICAL HISTORY    No past surgical history on file. CURRENT MEDICATIONS    Current Outpatient Rx   Medication Sig Dispense Refill    acetaminophen (TYLENOL) 500 MG tablet Take 2 tablets by mouth every 6 hours as needed for Pain 60 tablet 0    butalbital-aspirin-caffeine (FIORINAL) -40 MG per capsule Take 1 capsule by mouth every 6 hours as needed for Headaches or Migraine for up to 20 doses. . 20 capsule 0    Prenatal MV-Min-Fe Fum-FA-DHA (PRENATAL 1 PO) Take by mouth      diphenhydrAMINE (BENADRYL) 25 MG capsule Take 25 mg by mouth every 6 hours as needed for Itching      naproxen (NAPROSYN) 500 MG tablet Take 1 tablet by mouth 2 times daily as needed for Pain 20 tablet 0    ondansetron (ZOFRAN) 4 MG tablet Take 1 tablet by mouth every 8 hours as needed for Nausea 10 tablet 0    butalbital-acetaminophen-caffeine (FIORICET) -40 MG per tablet Take 1 tablet by mouth every 6 hours as needed for Headaches 10 tablet 0    ondansetron (ZOFRAN ODT) 4 MG disintegrating tablet Take 1 tablet by mouth every 8 hours as needed for Nausea or Vomiting 15 tablet 0       ALLERGIES    Allergies   Allergen Reactions    VITAL SIGNS: /65   Pulse 75   Temp 98.8 °F (37.1 °C) (Oral)   Resp 15   SpO2 99%   Breastfeeding Unknown   Constitutional:  Well developed, well nourished, no acute distress, non-toxic appearance   HENT:  NC/AT. Ears, nose, mouth normal.   Respiratory:  Normal respiratory effort. Musculoskeletal:  No edema, no tenderness, no deformities. DP intact, cap refill brisk. Integument: 2.5 cm laceration to the volar left forearm. RADIOLOGY/PROCEDURES      Laceration Repair Procedure Note    Indication: Laceration to left forearm    Procedure: The patient was placed in the appropriate position, site was prepped with betadine, and anesthesia achieved with 1.5 cc of 1% lidocaine plain. The area was then cleansed using HibiClens and irrigated with NS. The laceration was closed using 4 4-0 Prolene simple interrupted sutures. Total repaired wound length: 2.5 cm. The patient tolerated the procedure well. No immediate complications. ED COURSE & MEDICAL DECISION MAKING    Pertinent Labs & Imaging studies reviewed. (See chart for details)  -  Patient seen and evaluated in the emergency department. -  Triage and nursing notes reviewed and incorporated. -  Old chart records reviewed and incorporated. -  Supervising physician was Dr. Mitchell العراقي. Patient was seen independently. -  Differential diagnosis includes:  laceration, compartment syndrome, tendon/neurovascular injury, retained foreign body, and others  -  Patient treated with Tdap in the ED.  -  Laceration repair performed. Please see procedure note above. -  Patient dc home. Patient instructed on wound care, including cleaning the area, use of antibiotic ointment, and dressing changes. Follow-up with PCP or ED in 5-7 days for suture removal, sooner for any signs of infection--including redness, red streaking, drainage, fever or worse. Patient agreeable with plan of care and disposition.     In light of current events, I did utilize appropriate PPE (including N95 and surgical face mask, safety glasses, and gloves, as recommended by the health facility/national standard best practice, during my bedside interactions with the patient. FINAL IMPRESSION    1.  Laceration of left forearm, initial encounter              Benjamin Nolen PA-C  12/09/21 0011

## 2022-03-26 ENCOUNTER — HOSPITAL ENCOUNTER (EMERGENCY)
Age: 32
Discharge: HOME | End: 2022-03-26
Attending: EMERGENCY MEDICINE
Payer: MEDICAID

## 2022-03-26 ENCOUNTER — APPOINTMENT (OUTPATIENT)
Dept: GENERAL RADIOLOGY | Age: 32
End: 2022-03-26
Payer: MEDICAID

## 2022-03-26 VITALS
BODY MASS INDEX: 40.4 KG/M2 | HEIGHT: 63 IN | OXYGEN SATURATION: 100 % | WEIGHT: 228 LBS | TEMPERATURE: 97.8 F | RESPIRATION RATE: 18 BRPM | DIASTOLIC BLOOD PRESSURE: 82 MMHG | SYSTOLIC BLOOD PRESSURE: 149 MMHG | HEART RATE: 65 BPM

## 2022-03-26 DIAGNOSIS — S89.92XA INJURY OF LEFT KNEE, INITIAL ENCOUNTER: Primary | ICD-10-CM

## 2022-03-26 PROCEDURE — 99283 EMERGENCY DEPT VISIT LOW MDM: CPT

## 2022-03-26 PROCEDURE — 73562 X-RAY EXAM OF KNEE 3: CPT

## 2022-03-26 ASSESSMENT — PAIN DESCRIPTION - FREQUENCY: FREQUENCY: CONTINUOUS

## 2022-03-26 ASSESSMENT — PAIN DESCRIPTION - PAIN TYPE: TYPE: ACUTE PAIN

## 2022-03-26 ASSESSMENT — PAIN DESCRIPTION - LOCATION: LOCATION: KNEE

## 2022-03-26 ASSESSMENT — PAIN DESCRIPTION - DESCRIPTORS: DESCRIPTORS: SHOOTING;SHARP

## 2022-03-26 ASSESSMENT — PAIN SCALES - GENERAL: PAINLEVEL_OUTOF10: 7

## 2022-03-26 ASSESSMENT — PAIN DESCRIPTION - PROGRESSION: CLINICAL_PROGRESSION: GRADUALLY WORSENING

## 2022-03-26 ASSESSMENT — PAIN DESCRIPTION - ORIENTATION: ORIENTATION: LEFT

## 2022-03-27 NOTE — ED PROVIDER NOTES
Triage Chief Complaint:   Knee Injury (left knee. pt states was bowling last night and left knee gave out. pt c/o pain on outside of knee. reports her dog ran into her leg today and now has shooting pains down to ankle and up to hip. )    [place]:  Cory Patel is a 28 y.o. female that presents with left knee pain and injury. Patient states that yesterday she was bowling and twisted her left knee and she has been having pain located to the outside of her knee especially with walking. Pain is aching and sharp in nature. States that her dog ran into her leg today and now she has worsening shooting pains down to her ankle and up to her hip. Denies any motor or sensory deficits. She has been weightbearing. No other acute complaints. ROS:  At least 6 systems reviewed and otherwise acutely negative except as in the [address] Past Medical History:   Diagnosis Date    GERD (gastroesophageal reflux disease)     Migraine      History reviewed. No pertinent surgical history. History reviewed. No pertinent family history. Social History     Socioeconomic History    Marital status: Single     Spouse name: Not on file    Number of children: Not on file    Years of education: Not on file    Highest education level: Not on file   Occupational History    Not on file   Tobacco Use    Smoking status: Never Smoker    Smokeless tobacco: Never Used   Vaping Use    Vaping Use: Never used   Substance and Sexual Activity    Alcohol use: No    Drug use: No    Sexual activity: Yes     Partners: Male   Other Topics Concern    Not on file   Social History Narrative    Not on file     Social Determinants of Health     Financial Resource Strain:     Difficulty of Paying Living Expenses: Not on file   Food Insecurity:     Worried About Running Out of Food in the Last Year: Not on file    Diana of Food in the Last Year: Not on file   Transportation Needs:     Lack of Transportation (Medical):  Not on file    Lack of Transportation (Non-Medical): Not on file   Physical Activity:     Days of Exercise per Week: Not on file    Minutes of Exercise per Session: Not on file   Stress:     Feeling of Stress : Not on file   Social Connections:     Frequency of Communication with Friends and Family: Not on file    Frequency of Social Gatherings with Friends and Family: Not on file    Attends [employer] Services: Not on file    Active Member of [address] [place] [address] or Organizations: Not on file    Attends Club or Organization Meetings: Not on file    Marital Status: Not on file   Intimate Partner Violence:     Fear of Current or Ex-Partner: Not on file    Emotionally Abused: Not on file    Physically Abused: Not on file    Sexually Abused: Not on file   Housing Stability:     Unable to Pay for Housing in the Last Year: Not on file    Number of [place] in the Last Year: Not on file    Unstable Housing in the Last Year: Not on file     No current facility-administered medications for this encounter. Current Outpatient Medications   Medication Sig Dispense Refill    acetaminophen (TYLENOL) 500 MG tablet Take 2 tablets by mouth every 6 hours as needed for Pain 60 tablet 0    butalbital-aspirin-caffeine (FIORINAL) -40 MG per capsule Take 1 capsule by mouth every 6 hours as needed for Headaches or Migraine for up to 20 doses. . 20 capsule 0    Prenatal MV-Min-Fe Fum-FA-DHA (PRENATAL 1 PO) Take by mouth      diphenhydrAMINE (BENADRYL) 25 MG capsule Take 25 mg by mouth every 6 hours as needed for Itching      naproxen (NAPROSYN) 500 MG tablet Take 1 tablet by mouth 2 times daily as needed for Pain 20 tablet 0    ondansetron (ZOFRAN) 4 MG tablet Take 1 tablet by mouth every 8 hours as needed for Nausea 10 tablet 0    butalbital-acetaminophen-caffeine (FIORICET) -40 MG per tablet Take 1 tablet by mouth every 6 hours as needed for Headaches 10 tablet 0    ondansetron (ZOFRAN ODT) 4 MG disintegrating tablet Take 1 tablet by mouth every 8 hours as needed for Nausea or Vomiting 15 tablet 0     Allergies   Allergen Reactions    Dilaudid [Hydromorphone] Other (See Comments)     bradycardia    Red Dye Swelling       Nursing Notes Reviewed    Physical Exam:  ED Triage Vitals [03/26/22 2050]   Enc Vitals Group      BP (!) 149/82      Pulse 65      Resp 18      Temp 97.8 °F (36.6 °C)      Temp Source Oral      SpO2 100 %      Weight 228 lb (103.4 kg)      Height 5' 3\" (1.6 m)      Head Circumference       Peak Flow       Pain Score       Pain Loc       Pain Edu? Excl. in [address]? GENERAL APPEARANCE: Awake and alert. Cooperative. No acute distress. EXTREMITIES: LLE: No erythema or warmth. No effusion. Anterior and posterior drawer intact. Varus and valgus testing intact. Patient has full range of motion without significant pain. 2+ DP pulse. 5/5 strength on knee extension and flexion. Sensory distribution of sural/saphenous/tibial/peroneal nerves intact. SKIN: Warm and dry. I have reviewed and interpreted all of the currently available lab results from this visit (if applicable):  No results found for this visit on 03/26/22. Radiographs (if obtained):  [] The following radiograph was interpreted by myself in the absence of a radiologist:  [x] Radiologist's Report Reviewed:    EKG (if obtained): (All EKG's are interpreted by myself in the absence of a cardiologist)    MDM:  Plan of care is discussed thoroughly with the patient and family if present. If performed, all imaging and lab work also discussed with patient. All relevant prior results and chart reviewed if available. Patient presents as above. She is in no acute distress. She is neurovascular intact. No evidence of infection on exam.  No evidence of ligamentous instability. X-ray showed no evidence of acute osseous abnormality. Patient has been ambulatory.   Encouraged to use NSAIDs and Tylenol as needed and return for any new or worsening symptoms. Clinical Impression:  1.  Injury of left knee, initial encounter      (Please note that portions of this note may have been completed with a voice recognition program. Efforts were made to edit the dictations but occasionally words are mis-transcribed.)    MD Cm Mckenna MD  03/27/22 6241

## 2022-08-01 ENCOUNTER — HOSPITAL ENCOUNTER (EMERGENCY)
Age: 32
Discharge: HOME | End: 2022-08-01
Attending: EMERGENCY MEDICINE
Payer: MEDICAID

## 2022-08-01 ENCOUNTER — APPOINTMENT (OUTPATIENT)
Dept: CT IMAGING | Age: 32
End: 2022-08-01
Payer: MEDICAID

## 2022-08-01 VITALS
TEMPERATURE: 98.5 F | HEART RATE: 79 BPM | BODY MASS INDEX: 41.82 KG/M2 | RESPIRATION RATE: 16 BRPM | WEIGHT: 236 LBS | OXYGEN SATURATION: 98 % | SYSTOLIC BLOOD PRESSURE: 119 MMHG | HEIGHT: 63 IN | DIASTOLIC BLOOD PRESSURE: 74 MMHG

## 2022-08-01 DIAGNOSIS — R10.31 ABDOMINAL PAIN, RIGHT LOWER QUADRANT: Primary | ICD-10-CM

## 2022-08-01 DIAGNOSIS — T83.32XA MALPOSITIONED IUD, INITIAL ENCOUNTER: ICD-10-CM

## 2022-08-01 LAB
ALBUMIN SERPL-MCNC: 4.5 GM/DL (ref 3.4–5)
ALP BLD-CCNC: 141 IU/L (ref 40–129)
ALT SERPL-CCNC: 13 U/L (ref 10–40)
ANION GAP SERPL CALCULATED.3IONS-SCNC: 10 MMOL/L (ref 4–16)
AST SERPL-CCNC: 15 IU/L (ref 15–37)
BACTERIA: NEGATIVE /HPF
BASOPHILS ABSOLUTE: 0.1 K/CU MM
BASOPHILS RELATIVE PERCENT: 0.8 % (ref 0–1)
BILIRUB SERPL-MCNC: 0.6 MG/DL (ref 0–1)
BILIRUBIN URINE: NEGATIVE MG/DL
BLOOD, URINE: NEGATIVE
BUN BLDV-MCNC: 18 MG/DL (ref 6–23)
CALCIUM SERPL-MCNC: 9.3 MG/DL (ref 8.3–10.6)
CHLORIDE BLD-SCNC: 106 MMOL/L (ref 99–110)
CLARITY: CLEAR
CO2: 24 MMOL/L (ref 21–32)
COLOR: YELLOW
CREAT SERPL-MCNC: 0.7 MG/DL (ref 0.6–1.1)
DIFFERENTIAL TYPE: ABNORMAL
EOSINOPHILS ABSOLUTE: 0.1 K/CU MM
EOSINOPHILS RELATIVE PERCENT: 0.8 % (ref 0–3)
GFR AFRICAN AMERICAN: >60 ML/MIN/1.73M2
GFR NON-AFRICAN AMERICAN: >60 ML/MIN/1.73M2
GLUCOSE BLD-MCNC: 123 MG/DL (ref 70–99)
GLUCOSE, URINE: NEGATIVE MG/DL
HCG QUALITATIVE: NEGATIVE
HCT VFR BLD CALC: 39.5 % (ref 37–47)
HEMOGLOBIN: 12.8 GM/DL (ref 12.5–16)
IMMATURE NEUTROPHIL %: 0.1 % (ref 0–0.43)
KETONES, URINE: NEGATIVE MG/DL
LEUKOCYTE ESTERASE, URINE: NEGATIVE
LIPASE: 29 IU/L (ref 13–60)
LYMPHOCYTES ABSOLUTE: 1.4 K/CU MM
LYMPHOCYTES RELATIVE PERCENT: 19.5 % (ref 24–44)
MCH RBC QN AUTO: 28.7 PG (ref 27–31)
MCHC RBC AUTO-ENTMCNC: 32.4 % (ref 32–36)
MCV RBC AUTO: 88.6 FL (ref 78–100)
MONOCYTES ABSOLUTE: 0.4 K/CU MM
MONOCYTES RELATIVE PERCENT: 5.8 % (ref 0–4)
NITRITE URINE, QUANTITATIVE: NEGATIVE
NUCLEATED RBC %: 0 %
PDW BLD-RTO: 13.4 % (ref 11.7–14.9)
PH, URINE: 7.5 (ref 5–8)
PLATELET # BLD: 251 K/CU MM (ref 140–440)
PMV BLD AUTO: 11 FL (ref 7.5–11.1)
POTASSIUM SERPL-SCNC: 4 MMOL/L (ref 3.5–5.1)
PROTEIN UA: NEGATIVE MG/DL
RBC # BLD: 4.46 M/CU MM (ref 4.2–5.4)
RBC URINE: NORMAL /HPF (ref 0–6)
SEGMENTED NEUTROPHILS ABSOLUTE COUNT: 5.2 K/CU MM
SEGMENTED NEUTROPHILS RELATIVE PERCENT: 73 % (ref 36–66)
SODIUM BLD-SCNC: 140 MMOL/L (ref 135–145)
SPECIFIC GRAVITY UA: 1.01 (ref 1–1.03)
SQUAMOUS EPITHELIAL: 1 /HPF
TOTAL IMMATURE NEUTOROPHIL: 0.01 K/CU MM
TOTAL NUCLEATED RBC: 0 K/CU MM
TOTAL PROTEIN: 7.4 GM/DL (ref 6.4–8.2)
TRICHOMONAS: NORMAL /HPF
UROBILINOGEN, URINE: 0.2 MG/DL (ref 0.2–1)
WBC # BLD: 7.1 K/CU MM (ref 4–10.5)
WBC UA: NORMAL /HPF (ref 0–5)

## 2022-08-01 PROCEDURE — 80053 COMPREHEN METABOLIC PANEL: CPT

## 2022-08-01 PROCEDURE — 99285 EMERGENCY DEPT VISIT HI MDM: CPT

## 2022-08-01 PROCEDURE — 85025 COMPLETE CBC W/AUTO DIFF WBC: CPT

## 2022-08-01 PROCEDURE — 74177 CT ABD & PELVIS W/CONTRAST: CPT

## 2022-08-01 PROCEDURE — 83690 ASSAY OF LIPASE: CPT

## 2022-08-01 PROCEDURE — 6360000004 HC RX CONTRAST MEDICATION: Performed by: EMERGENCY MEDICINE

## 2022-08-01 PROCEDURE — 81001 URINALYSIS AUTO W/SCOPE: CPT

## 2022-08-01 PROCEDURE — 84703 CHORIONIC GONADOTROPIN ASSAY: CPT

## 2022-08-01 RX ADMIN — IOPAMIDOL 75 ML: 755 INJECTION, SOLUTION INTRAVENOUS at 18:06

## 2022-08-01 NOTE — ED NOTES
Braeden Sierra [address] paged Dr Isiah Batista  08/01/22 1847    1845 Dr Tri Schulz returned call      Irvin Caal  08/01/22 1858

## 2022-08-01 NOTE — ED PROVIDER NOTES
EMERGENCY DEPARTMENT ENCOUNTER      CHIEF COMPLAINT:   Right lower quadrant abdominal pain    HPI: Gillian Yeh is a 33 y.o. female who presents to the Emergency Department complaining of right lower quadrant abdominal pain. The patient states that she has been having pain for the past 10 days. She states that it feels like a dull ache with intermittent sharp pains in the right lower quadrant. It is worse with movement and better with rest.  She denies any associated nausea, vomiting, diarrhea or constipation. She denies dysuria, hematuria or foul-smelling urine. She has an IUD in place. Her last menstrual period was in 2019. The patient denies fevers, chills, hematemesis, bloody stools, flank pain, or any other complaints. REVIEW OF SYSTEMS:  CONSTITUTIONAL:  Denies fever, chills, weight loss or weakness  EYES:  Denies photophobia or discharge  ENT:  Denies sore throat or ear pain  CARDIOVASCULAR:  Denies chest pain, palpitations or swelling  RESPIRATORY:  Denies cough or shortness of breath  GI: See HPI  MUSCULOSKELETAL:  Denies back pain  SKIN:  No rash  NEUROLOGIC:  Denies headache, focal weakness or sensory changes  All systems negative except as marked. \"Remaining review of systems reviewed and negative. I have reviewed the nursing triage documentation and agree unless otherwise noted below. \"    PAST MEDICAL HISTORY:   Past Medical History:   Diagnosis Date    GERD (gastroesophageal reflux disease)     Migraine        CURRENT MEDICATIONS:   Home medications reviewed. SURGICAL HISTORY:   History reviewed. No pertinent surgical history. FAMILY HISTORY:   History reviewed. No pertinent family history.     SOCIAL HISTORY:   Social History     Socioeconomic History    Marital status: Single     Spouse name: Not on file    Number of children: Not on file    Years of education: Not on file    Highest education level: Not on file   Occupational History    Not on file   Tobacco Use    Smoking status: Never    Smokeless tobacco: Never   Vaping Use    Vaping Use: Never used   Substance and Sexual Activity    Alcohol use: No    Drug use: No    Sexual activity: Yes     Partners: Male   Other Topics Concern    Not on file   Social History Narrative    Not on file     Social Determinants of Health     Financial Resource Strain: Not on file   Food Insecurity: Not on file   Transportation Needs: Not on file   Physical Activity: Not on file   Stress: Not on file   Social Connections: Not on file   Intimate Partner Violence: Not on file   Housing Stability: Not on file       ALLERGIES: Dilaudid [hydromorphone] and Red dye    PHYSICAL EXAM:  VITAL SIGNS:   ED Triage Vitals [08/01/22 1522]   Enc Vitals Group      /74      Heart Rate 79      Resp 16      Temp 98.5 °F (36.9 °C)      Temp Source Oral      SpO2 98 %      Weight 236 lb (107 kg)      Height 5' 3\" (1.6 m)      Head Circumference       Peak Flow       Pain Score       Pain Loc       Pain Edu? Excl. in [address]? Constitutional:  Non-toxic appearance  HENT: Normocephalic, Atraumatic, Bilateral external ears normal, Oropharynx moist, No oral exudates, Nose normal.  Eyes: PERRL, conjunctiva normal   Neck: Normal range of motion, No tenderness, Supple, No stridor,No lymphadenopathy   Cardiovascular:  Normal heart rate, Normal rhythm  Pulmonary/Chest:  Normal breath sounds, No respiratory distress, No wheezing  Abdomen:   Bowel sounds normal, Soft, right lower quadrant tenderness to palpation, tender at McBurney's point but not more significantly tender than the surrounding areas, no masses, No pulsatile masses  Extremities:  Normal range of motion, Intact distal pulses, No edema, No tenderness  Skin:  Warm, Dry, No erythema, No rash      EKG:    None    Radiology / Procedures:       CT ABDOMEN PELVIS W IV CONTRAST Additional Contrast? None (Final result)  Result time 08/01/22 18:33:32  Final result by Dejuan Valerio MD (08/01/22 18:33:32) Impression:    No acute abnormality identified. Visualized portions of the appendix are   normal in appearance. The IUD may be malpositioned with embedment of the limbs, with 1 of the limbs   seen at the very edge of the myometrium in a subserosal location. Gynecologic consultation is recommended. Narrative:    EXAMINATION:   CT OF THE ABDOMEN AND PELVIS WITH CONTRAST 8/1/2022 3:05 pm     TECHNIQUE:   CT of the abdomen and pelvis was performed with the administration of   intravenous contrast. Multiplanar reformatted images are provided for review. Automated exposure control, iterative reconstruction, and/or weight based   adjustment of the mA/kV was utilized to reduce the radiation dose to as low   as reasonably achievable. COMPARISON:   08/09/2017     HISTORY:   ORDERING SYSTEM PROVIDED HISTORY: RLQ abdominal pain   TECHNOLOGIST PROVIDED HISTORY:   Reason for exam:->RLQ abdominal pain   Additional Contrast?->None   Decision Support Exception - unselect if not a suspected or confirmed   emergency medical condition->Emergency Medical Condition (MA)   Reason for Exam: RLQ abdominal pain     FINDINGS:   Lower Chest: The visualized lungs are clear. Base of the heart is   unremarkable. Visualized extra thoracic soft tissues are unremarkable. Organs: Liver enhances normally. Gallbladder unremarkable. Portal vein   patent. Spleen enhances normally. Adrenals unremarkable. Pancreas unremarkable. No acute or suspicious renal abnormalities are identified. GI/Bowel: No large bowel abnormalities are identified. The visualized   portions of the appendix are unremarkable. The tip is not well visualized   but no asymmetric edema is seen in that region. Distal esophagus and stomach are unremarkable. Duodenal sweep and the   remainder of the small bowel are unremarkable. Pelvis: IUD is noted. The limbs appear embedded in the uterine fundal   myometrium.   The leftward limb is found immediately in a subserosal location   at the edge of the myometrium. Scant amount of free pelvic fluid is noted which is likely physiologic. Urinary bladder is unremarkable. Peritoneum/Retroperitoneum: Abdominal aorta normal in caliber. Superior   mesenteric artery is normally enhancing. Bones/Soft Tissues: No acute or suspicious bony abnormalities are identified. Labs Reviewed   CBC WITH AUTO DIFFERENTIAL - Abnormal; Notable for the following components:       Result Value    Segs Relative 73.0 (*)     Lymphocytes % 19.5 (*)     Monocytes % 5.8 (*)     All other components within normal limits   COMPREHENSIVE METABOLIC PANEL - Abnormal; Notable for the following components:    Glucose 123 (*)     Alkaline Phosphatase 141 (*)     All other components within normal limits   LIPASE   HCG, SERUM, QUALITATIVE   URINALYSIS WITH MICROSCOPIC       ED COURSE & MEDICAL DECISION MAKING:  Pertinent Labs & Imaging studies reviewed. (See chart for details)  On exam, the patient is afebrile and nontoxic appearing. She is hemodynamically stable and neurologically intact. Labs are obtained and there are no clinically significant lab abnormalities. Pregnancy test is negative. Urinalysis is negative. CT abdomen and pelvis was obtained and there is no acute abnormality identified. The visualized portions of the appendix are normal in appearance. The IUD may be malpositioned with embedment of the limbs with one of the limbs seen at the very edge of the myometrium in a subserosal location. Gynecology consultation is recommended. I spoke with the GYN covering for the patient's OB/GYN, Dr. Hal Chawla, regarding the next steps in evaluation and she states that the patient can have an outpatient ultrasound in their office and if it is malpositioned they can remove the IUD at that time. The patient declined needing anything for pain.     The etiology of the patient's right lower quadrant abdominal pain is unclear. Her IUD may be malpositioned, but does not appear to be near the area of pain. I have a low suspicion for acute appendicitis, intraabdominal abscess, perforation, bowel obstruction, AAA, dissection, ischemic bowel, or acute surgical abdomen. I feel that the patient is stable for outpatient management with follow up in 2-3 days. The patient is given return precautions. . The patient verbalized understanding, was agreeable with plan, and the patient was discharged home in stable condition. Clinical Impression:  1. Abdominal pain, right lower quadrant    2. Malpositioned IUD, initial encounter        Disposition referral (if applicable): [facility]  [phone]  Schedule an appointment as soon as possible for a visit       [facility] Emergency Department  [place] [facility] [phone]  Go to   If symptoms worsen      Disposition medications (if applicable):  Discharge Medication List as of 8/1/2022  6:56 PM            Comment: Please note this report has been produced using speech recognition software and may contain errors related to that system including errors in grammar, punctuation, and spelling, as well as words and phrases that may be inappropriate. If there are any questions or concerns please feel free to contact the dictating provider for clarification.         Oscar Rich MD  08/01/22 2180

## 2022-08-01 NOTE — ED TRIAGE NOTES
Pt complaining of Na/Vo RLQ abd pain over the last ten days. Pain 5/10 at rest, 8/10 with movement and bending.

## 2022-08-01 NOTE — DISCHARGE INSTRUCTIONS
As we discussed, your IUD may be malpositioned. You will need to have a 3D ultrasound with your OB/GYN in the near future to further assess this. Please call to schedule an appointment.

## 2022-08-03 ENCOUNTER — HOSPITAL ENCOUNTER (OUTPATIENT)
Age: 32
Setting detail: SPECIMEN
Discharge: HOME | End: 2022-08-03
Payer: MEDICAID

## 2022-08-03 ENCOUNTER — OFFICE VISIT (OUTPATIENT)
Dept: OBGYN | Age: 32
End: 2022-08-03
Payer: MEDICAID

## 2022-08-03 VITALS
DIASTOLIC BLOOD PRESSURE: 79 MMHG | BODY MASS INDEX: 41.29 KG/M2 | WEIGHT: 233 LBS | HEART RATE: 73 BPM | HEIGHT: 63 IN | SYSTOLIC BLOOD PRESSURE: 116 MMHG

## 2022-08-03 DIAGNOSIS — T83.31XA MECHANICAL BREAKDOWN OF INTRAUTERINE CONTRACEPTIVE DEVICE, INITIAL ENCOUNTER: ICD-10-CM

## 2022-08-03 DIAGNOSIS — Z01.419 ENCOUNTER FOR WELL WOMAN EXAM WITH ROUTINE GYNECOLOGICAL EXAM: Primary | ICD-10-CM

## 2022-08-03 PROCEDURE — 88142 CYTOPATH C/V THIN LAYER: CPT

## 2022-08-03 PROCEDURE — 87801 DETECT AGNT MULT DNA AMPLI: CPT

## 2022-08-03 PROCEDURE — 87624 HPV HI-RISK TYP POOLED RSLT: CPT

## 2022-08-03 PROCEDURE — 99385 PREV VISIT NEW AGE 18-39: CPT | Performed by: OBSTETRICS & GYNECOLOGY

## 2022-08-03 RX ORDER — LEVONORGESTREL 52 MG/1
1 INTRAUTERINE DEVICE INTRAUTERINE ONCE
COMMUNITY

## 2022-08-03 SDOH — ECONOMIC STABILITY: FOOD INSECURITY: WITHIN THE PAST 12 MONTHS, THE FOOD YOU BOUGHT JUST DIDN'T LAST AND YOU DIDN'T HAVE MONEY TO GET MORE.: NEVER TRUE

## 2022-08-03 SDOH — ECONOMIC STABILITY: HOUSING INSECURITY
IN THE LAST 12 MONTHS, WAS THERE A TIME WHEN YOU DID NOT HAVE A STEADY PLACE TO SLEEP OR SLEPT IN A SHELTER (INCLUDING NOW)?: NO

## 2022-08-03 SDOH — ECONOMIC STABILITY: TRANSPORTATION INSECURITY
IN THE PAST 12 MONTHS, HAS LACK OF TRANSPORTATION KEPT YOU FROM MEETINGS, WORK, OR FROM GETTING THINGS NEEDED FOR DAILY LIVING?: NO

## 2022-08-03 SDOH — ECONOMIC STABILITY: INCOME INSECURITY: IN THE LAST 12 MONTHS, WAS THERE A TIME WHEN YOU WERE NOT ABLE TO PAY THE MORTGAGE OR RENT ON TIME?: NO

## 2022-08-03 SDOH — ECONOMIC STABILITY: FOOD INSECURITY: WITHIN THE PAST 12 MONTHS, YOU WORRIED THAT YOUR FOOD WOULD RUN OUT BEFORE YOU GOT MONEY TO BUY MORE.: NEVER TRUE

## 2022-08-03 SDOH — ECONOMIC STABILITY: TRANSPORTATION INSECURITY
IN THE PAST 12 MONTHS, HAS THE LACK OF TRANSPORTATION KEPT YOU FROM MEDICAL APPOINTMENTS OR FROM GETTING MEDICATIONS?: NO

## 2022-08-03 ASSESSMENT — PATIENT HEALTH QUESTIONNAIRE - PHQ9
SUM OF ALL RESPONSES TO PHQ QUESTIONS 1-9: 0
SUM OF ALL RESPONSES TO PHQ QUESTIONS 1-9: 0
2. FEELING DOWN, DEPRESSED OR HOPELESS: 0
SUM OF ALL RESPONSES TO PHQ QUESTIONS 1-9: 0
1. LITTLE INTEREST OR PLEASURE IN DOING THINGS: 0
SUM OF ALL RESPONSES TO PHQ9 QUESTIONS 1 & 2: 0
SUM OF ALL RESPONSES TO PHQ QUESTIONS 1-9: 0

## 2022-08-03 ASSESSMENT — SOCIAL DETERMINANTS OF HEALTH (SDOH): HOW HARD IS IT FOR YOU TO PAY FOR THE VERY BASICS LIKE FOOD, HOUSING, MEDICAL CARE, AND HEATING?: SOMEWHAT HARD

## 2022-08-03 NOTE — PROGRESS NOTES
8/3/22    Brett Silva  1990    Chief Complaint   Patient presents with    Gynecologic Exam     Annual exam, no menses due to Tanya Rashid, IUD inserted 2020, sexually active, pap 2019 neg., c/o pelvic pain. Pelvic Pain     C/o pelvic pain that started as a dull ache and now is a sharp shooting pelvic pain x2 wks, pain intensifies with movement. Went to [facility] due pain had CT scan and was told her IUD is in the uterine wall . Brett Silva is a 33 y.o. female who presents today for evaluation of annual examination. She also seen in the emergency department and CT scan which suggested the IUD may be embedded in uterine wall. Past Medical History:   Diagnosis Date    Abnormal Pap smear of cervix     Anxiety     GERD (gastroesophageal reflux disease)     Gestational hypertension     History of trichomoniasis     Migraine     Obesity     Pelvic pain     Postpartum depression        No past surgical history on file. Social History     Tobacco Use    Smoking status: Never    Smokeless tobacco: Never   Vaping Use    Vaping Use: Never used   Substance Use Topics    Alcohol use: No    Drug use: No       Family History   Problem Relation Age of Onset    Cancer Paternal Grandfather     Diabetes Paternal Grandfather     Breast Cancer Maternal Grandmother     Hypertension Maternal Grandmother     Cancer Maternal Grandfather     Diabetes Maternal Grandfather        Current Outpatient Medications   Medication Sig Dispense Refill    levonorgestrel (LILETTA, 52 MG,) 20.1 MCG/DAY IUD IUD 52 mg 1 each by IntraUTERine route once       No current facility-administered medications for this visit.        Allergies   Allergen Reactions    Dilaudid [Hydromorphone] Other (See Comments)     bradycardia    Red Dye Swelling           Immunization History   Administered Date(s) Administered    Tdap (Boostrix, Adacel) 2021       Review of Systems  All other systems reviewed and are negative    /79

## 2022-08-06 LAB
CHLAMYDIA BY THIN PREP: NEGATIVE
GC BY THIN PREP: NEGATIVE

## 2022-08-07 LAB
HPV HIGH RISK: DETECTED
HPV, GENOTYPE 16: NOT DETECTED
HPV, GENOTYPE 18: DETECTED

## 2022-08-15 ENCOUNTER — TELEPHONE (OUTPATIENT)
Dept: OBGYN | Age: 32
End: 2022-08-15

## 2022-08-15 NOTE — TELEPHONE ENCOUNTER
Pt saw results from 8/3/22 on my Chart. Pt requesting results and if she needs to come in for further testing.

## 2022-08-16 PROBLEM — B97.7 HPV IN FEMALE: Status: ACTIVE | Noted: 2022-08-16

## 2022-08-16 PROBLEM — R87.612 LGSIL ON PAP SMEAR OF CERVIX: Status: ACTIVE | Noted: 2022-08-16

## 2023-08-22 ENCOUNTER — HOSPITAL ENCOUNTER (EMERGENCY)
Age: 33
Discharge: HOME | End: 2023-08-22
Payer: MEDICAID

## 2023-08-22 ENCOUNTER — APPOINTMENT (OUTPATIENT)
Dept: GENERAL RADIOLOGY | Age: 33
End: 2023-08-22
Payer: MEDICAID

## 2023-08-22 VITALS
HEIGHT: 63 IN | OXYGEN SATURATION: 100 % | SYSTOLIC BLOOD PRESSURE: 104 MMHG | WEIGHT: 230 LBS | RESPIRATION RATE: 16 BRPM | BODY MASS INDEX: 40.75 KG/M2 | DIASTOLIC BLOOD PRESSURE: 76 MMHG | TEMPERATURE: 98.1 F | HEART RATE: 74 BPM

## 2023-08-22 DIAGNOSIS — T14.8XXA FOREIGN BODY IN SKIN: Primary | ICD-10-CM

## 2023-08-22 PROCEDURE — 90471 IMMUNIZATION ADMIN: CPT | Performed by: NURSE PRACTITIONER

## 2023-08-22 PROCEDURE — 90715 TDAP VACCINE 7 YRS/> IM: CPT | Performed by: NURSE PRACTITIONER

## 2023-08-22 PROCEDURE — 99284 EMERGENCY DEPT VISIT MOD MDM: CPT

## 2023-08-22 PROCEDURE — 73630 X-RAY EXAM OF FOOT: CPT

## 2023-08-22 PROCEDURE — 6360000002 HC RX W HCPCS: Performed by: NURSE PRACTITIONER

## 2023-08-22 RX ORDER — LIDOCAINE HYDROCHLORIDE 10 MG/ML
5 INJECTION, SOLUTION INFILTRATION; PERINEURAL ONCE
Status: DISCONTINUED | OUTPATIENT
Start: 2023-08-22 | End: 2023-08-22 | Stop reason: HOSPADM

## 2023-08-22 RX ADMIN — TETANUS TOXOID, REDUCED DIPHTHERIA TOXOID AND ACELLULAR PERTUSSIS VACCINE, ADSORBED 0.5 ML: 5; 2.5; 8; 8; 2.5 SUSPENSION INTRAMUSCULAR at 16:53

## 2023-08-22 NOTE — DISCHARGE INSTRUCTIONS
Keep the affected skin clean and dry, follow-up with primary care physician in 2 to 3 days, return to ER for worsening symptoms or any other concerns.

## 2023-08-22 NOTE — ACP (ADVANCE CARE PLANNING)
Patient does not have any ACP documents/Medical Power of . LSW notes hospital will follow [place] Next of Kin hierarchy in the following descending order for priority:    Guardian  Spouse  Majority of adult Children  Parents  Majority of adult Siblings  Nearest Relative not described above    Per [place] Next of Kin hierarchy: Patients' parent will be [address]

## 2023-08-22 NOTE — ED PROVIDER NOTES
**ADVANCED PRACTICE PROVIDER, I HAVE EVALUATED THIS PATIENT**        [address] ENCOUNTER      Pt Name: Makayla Barba  VBR:[id number]  [address] [facility] [place] 1990  Date of evaluation: 8/22/2023  Provider: DIANE Madison CNP      Chief Complaint:    Chief Complaint   Patient presents with    Foreign Body in Skin     Pt reports she has a small piece of glass on bottom of left foot         Nursing Notes, Past Medical Hx, Past Surgical Hx, Social Hx, Allergies, and Family Hx were all reviewed and agreed with or any disagreements were addressed in the HPI.    HPI: (Location, Duration, Timing, Severity, Quality, Assoc Sx, Context, Modifying factors)    Chief Complaint of foreign body in skin    This is a  29 y.o. female who presents with foreign body in skin. Patient stated he broke a glass bottle yesterday. She tried to clean the floor. She believes 1 piece stuck in her left foot. Patient tried to remove it but it was unsuccessful    PastMedical/Surgical History:      Diagnosis Date    Abnormal Pap smear of cervix     Anxiety     GERD (gastroesophageal reflux disease)     Gestational hypertension     History of trichomoniasis     Migraine     Obesity     Pelvic pain     Postpartum depression      No past surgical history on file. Medications:  Previous Medications    LEVONORGESTREL (LILETTA, 52 MG,) 20.1 MCG/DAY IUD IUD 52 MG    1 each by IntraUTERine route once         Review of Systems:  (2-9 systems needed)  Review of Systems   All other systems reviewed and are negative. \"Positives and Pertinent negatives as per HPI\"    Physical Exam:  Physical Exam  HENT:      Head: Normocephalic. Nose: Nose normal.      Mouth/Throat:      Mouth: Mucous membranes are moist.   Eyes:      Pupils: Pupils are equal, round, and reactive to light. Cardiovascular:      Rate and Rhythm: Normal rate. Pulses: Normal pulses.    Pulmonary:

## 2024-02-14 ENCOUNTER — HOSPITAL ENCOUNTER (EMERGENCY)
Age: 34
Discharge: HOME | End: 2024-02-14
Attending: EMERGENCY MEDICINE
Payer: MEDICAID

## 2024-02-14 VITALS
OXYGEN SATURATION: 96 % | WEIGHT: 234 LBS | SYSTOLIC BLOOD PRESSURE: 114 MMHG | RESPIRATION RATE: 15 BRPM | HEART RATE: 73 BPM | TEMPERATURE: 97.4 F | BODY MASS INDEX: 41.46 KG/M2 | DIASTOLIC BLOOD PRESSURE: 77 MMHG | HEIGHT: 63 IN

## 2024-02-14 DIAGNOSIS — S89.91XA KNEE INJURY, RIGHT, INITIAL ENCOUNTER: Primary | ICD-10-CM

## 2024-02-14 PROCEDURE — 99283 EMERGENCY DEPT VISIT LOW MDM: CPT

## 2024-02-14 RX ORDER — NAPROXEN 500 MG/1
500 TABLET ORAL 2 TIMES DAILY
Qty: 14 TABLET | Refills: 0 | Status: SHIPPED | OUTPATIENT
Start: 2024-02-14

## 2024-02-14 NOTE — ED NOTES
An ace wrap was applied to the lower right leg. The patient was fitted with crutches and she was able to use them.

## 2024-02-14 NOTE — ED NOTES
The patient presents to the er today with complaints of posterior lower right leg pain. The patient reports that on Monday she \" went to sit down [place] style when she developed pain. The call light is within reach.            Staging Info: By selecting yes to the question above you will include information on AJCC 8 tumor staging in your Mohs note. Information on tumor staging will be automatically added for SCCs on the head and neck. AJCC 8 includes tumor size, tumor depth, perineural involvement and bone invasion.

## 2024-02-14 NOTE — DISCHARGE INSTRUCTIONS
Return immediately to the emergency department if you experience new or worsened symptoms, chest pain, shortness of breath, changes in color or sensation of your leg, or for any other concerns

## 2024-02-14 NOTE — ED PROVIDER NOTES
Emergency Department Encounter    Patient: Hilaria Fonseca  MRN: [id number]  : 1990  Date of Evaluation: 2024  ED Provider:  Higinio Montalvo MD    MDM:    Clinical Impression:  1. Knee injury, right, initial encounter          Triage Chief Complaint: Leg Pain (Reports of posterior lower right leg pain since Monday. )          I completed a structured, evidence-based clinical evaluation to screen for acute emergent condition that poses a threat to life or bodily function.      Diagnostic studies/Differential diagnosis included: (with independent interpretations \"as interpreted by me\" and tests considered but not performed) Patient presented with musculoskeletal complaints.  Based on patient's presentation, history and physical exam presentation appears most consistent with a soft tissue injury.  Imaging is not indicated at this time.  If symptoms do not improve, patient may benefit from MRI evaluation.  Patient does not have any evidence of compartment syndrome, necrotizing process, deep venous thrombosis, or neurovascular deficits.  The patient understands that at this time there is no evidence for a more significant underlying process, and that early in a process of such an injury and initial workup can be falsely negative.  Patient treated with Ace bandage and crutches.  RICE therapy.  NSAID as below.  Patient's symptoms will be treated symptomatically, prescriptions will be provided, they will be discharged to follow-up as an outpatient, they understand and agree with the plan, return warnings given.      I considered the following moderate comorbidities in the care of this patient:   Patient  has a past medical history of Abnormal Pap smear of cervix, Anxiety, GERD (gastroesophageal reflux disease), Gestational hypertension, History of trichomoniasis, Migraine, Obesity, Pelvic pain, and Postpartum depression.     Medications ordered in the ED:  ED Medication Orders (From admission, onward)      None

## 2024-02-14 NOTE — DISCHARGE INSTR - COC
Continuity of Care Form    Patient Name: Hilaria Fonseca   :  1990  MRN:  [id number]    Admit date:  2024  Discharge date:  ***    Code Status Order: No Order   Advance Directives:     Admitting Physician:  No admitting provider for patient encounter.  PCP: Genoveva Montague MD    Discharging Nurse: ***  Discharging Hospital Unit/Room#: ED-02/E02  Discharging Unit Phone Number: ***    Emergency Contact:   Extended Emergency Contact Information  Primary Emergency Contact: Lola Charles  Address: [address] [place] [place] [address] [facility]  Home Phone: [phone]  Relation: Parent    Past Surgical History:  History reviewed. No pertinent surgical history.    Immunization History:   Immunization History   Administered Date(s) Administered    COVID-19, MODERNA BLUE border, Primary or Immunocompromised, (age 12y+), IM, 100 mcg/0.5mL 2021, 2021    TDaP, ADACEL (age 10y-64y), BOOSTRIX (age 10y+), IM, 0.5mL 2021, 2023       Active Problems:  Patient Active Problem List   Diagnosis Code    GERD (gastroesophageal reflux disease) K21.9    Migraine G43.909    Migraine G43.909    LGSIL on Pap smear of cervix R87.612    HPV in female B97.7       Isolation/Infection:   Isolation            No Isolation          Patient Infection Status       None to display                     Nurse Assessment:  Last Vital Signs: /77   Pulse 73   Temp 97.4 °F (36.3 °C) (Oral)   Resp 15   Ht 1.6 m (5' 3\")   Wt 106.1 kg (234 lb)   LMP 2023 (Approximate)   SpO2 96%   BMI 41.45 kg/m²     Last documented pain score (0-10 scale): Pain Level: 6  Last Weight:   Wt Readings from Last 1 Encounters:   24 106.1 kg (234 lb)     Mental Status:  {IP PT MENTAL STATUS:}    IV Access:  { KITTY IV ACCESS:087389116}    Nursing Mobility/ADLs:  Walking   {CHP DME ADLs:346796780}  Transfer  {CHP DME ADLs:666084084}  Bathing  {CHP DME ADLs:228099545}  Dressing  {CHP DME

## 2024-05-05 NOTE — PROGRESS NOTES
Out of Food in the Last Year: Never true   Transportation Needs: No Transportation Needs (8/3/2022)    PRAPARE - Transportation     Lack of Transportation (Medical): No     Lack of Transportation (Non-Medical): No   Housing Stability: Unknown (8/3/2022)    Housing Stability Vital Sign     Unable to Pay for Housing in the Last Year: No     Unstable Housing in the Last Year: No         FAMILY HISTORY     Family History   Problem Relation Age of Onset    Cancer Paternal Grandfather     Diabetes Paternal Grandfather     Breast Cancer Maternal Grandmother     Hypertension Maternal Grandmother     Cancer Maternal Grandfather     Diabetes Maternal Grandfather          PHYSICAL EXAM     Wt Readings from Last 3 Encounters:   05/09/24 112.7 kg (248 lb 6 oz)   02/14/24 106.1 kg (234 lb)   08/22/23 104.3 kg (230 lb)     Temp Readings from Last 3 Encounters:   02/14/24 97.4 °F (36.3 °C) (Oral)   08/22/23 98.1 °F (36.7 °C)   08/01/22 98.5 °F (36.9 °C) (Oral)     BP Readings from Last 3 Encounters:   05/09/24 116/82   02/14/24 114/77   08/22/23 104/76     Pulse Readings from Last 3 Encounters:   05/09/24 68   02/14/24 73   08/22/23 74       General appearance:  Alert and oriented, NAD, well developed   HEENT: EOMI, no scleral injection, moist mucous membranes, no oral ulcers, normal hearing, no cartilage inflammation  Neck: Trachea midline, no masses  Lymph: no LAD  Lungs: CTAB, no rales  Heart: regular rate and rhythm, S1, S2 normal, no murmur, no lower extremity edema  Abdomen: Soft, ND, NT. + BS.  Extremities: atraumatic, no cyanosis or edema.   Neurologic: CN 2-12 grossly intact.   Skin: No active rashes, warm and dry, no telangiectasias, no digital pitting, no sclerodactyly, no rheumatoid nodules, no livedo  MSK:   HANDS: Bilateral MCP and PIP tenderness+   Elbow: No synovitis, good ROM,   Shoulder:good ROM,   Knee: left knee tenderness+   Ankle:good ROM,   FEET: left  forefoot squeeze test    Spine:   lumbar tender

## 2024-05-09 ENCOUNTER — OFFICE VISIT (OUTPATIENT)
Dept: RHEUMATOLOGY | Age: 34
End: 2024-05-09
Payer: MEDICAID

## 2024-05-09 VITALS
BODY MASS INDEX: 44 KG/M2 | SYSTOLIC BLOOD PRESSURE: 116 MMHG | HEART RATE: 68 BPM | OXYGEN SATURATION: 97 % | DIASTOLIC BLOOD PRESSURE: 82 MMHG | WEIGHT: 248.38 LBS

## 2024-05-09 DIAGNOSIS — M25.50 POLYARTHRALGIA: Primary | ICD-10-CM

## 2024-05-09 DIAGNOSIS — M79.10 MYALGIA: ICD-10-CM

## 2024-05-09 DIAGNOSIS — E66.01 CLASS 3 SEVERE OBESITY WITH SERIOUS COMORBIDITY AND BODY MASS INDEX (BMI) OF 40.0 TO 44.9 IN ADULT, UNSPECIFIED OBESITY TYPE (HCC): ICD-10-CM

## 2024-05-09 DIAGNOSIS — Z01.89 ENCOUNTER FOR OTHER SPECIFIED SPECIAL EXAMINATIONS: ICD-10-CM

## 2024-05-09 PROCEDURE — G8417 CALC BMI ABV UP PARAM F/U: HCPCS | Performed by: STUDENT IN AN ORGANIZED HEALTH CARE EDUCATION/TRAINING PROGRAM

## 2024-05-09 PROCEDURE — 1036F TOBACCO NON-USER: CPT | Performed by: STUDENT IN AN ORGANIZED HEALTH CARE EDUCATION/TRAINING PROGRAM

## 2024-05-09 PROCEDURE — G8428 CUR MEDS NOT DOCUMENT: HCPCS | Performed by: STUDENT IN AN ORGANIZED HEALTH CARE EDUCATION/TRAINING PROGRAM

## 2024-05-09 PROCEDURE — 99205 OFFICE O/P NEW HI 60 MIN: CPT | Performed by: STUDENT IN AN ORGANIZED HEALTH CARE EDUCATION/TRAINING PROGRAM

## 2024-05-09 RX ORDER — FLUTICASONE PROPIONATE 50 MCG
1 SPRAY, SUSPENSION (ML) NASAL DAILY
COMMUNITY

## 2024-05-09 RX ORDER — AMITRIPTYLINE HYDROCHLORIDE 10 MG/1
10 TABLET, FILM COATED ORAL NIGHTLY
COMMUNITY

## 2024-05-09 NOTE — PATIENT INSTRUCTIONS
Complete ordered labs  Scheduled for sleep study  We will discuss results at next visit  RTC in 2 months      Lab Locations:    [facility]  [facility] [place] [address]  You must be at the lab by 3:00 p.m.   for the [facility] labs to be completed.    [address] [place] [address]    To Schedule DEXA Bone Scan, CT or MRI  Call at Central [phone]

## 2024-05-23 ENCOUNTER — HOSPITAL ENCOUNTER (OUTPATIENT)
Dept: SLEEP CENTER | Age: 34
Discharge: HOME | End: 2024-05-23
Payer: MEDICAID

## 2024-05-23 VITALS
WEIGHT: 242 LBS | OXYGEN SATURATION: 97 % | SYSTOLIC BLOOD PRESSURE: 129 MMHG | DIASTOLIC BLOOD PRESSURE: 60 MMHG | HEIGHT: 63 IN | BODY MASS INDEX: 42.88 KG/M2 | HEART RATE: 79 BPM

## 2024-05-23 DIAGNOSIS — E66.01 MORBID OBESITY WITH BMI OF 40.0-44.9, ADULT (HCC): ICD-10-CM

## 2024-05-23 DIAGNOSIS — G47.10 HYPERSOMNIA: ICD-10-CM

## 2024-05-23 DIAGNOSIS — G47.33 OSA (OBSTRUCTIVE SLEEP APNEA): Primary | ICD-10-CM

## 2024-05-23 PROCEDURE — 99211 OFF/OP EST MAY X REQ PHY/QHP: CPT

## 2024-05-23 PROCEDURE — 99204 OFFICE O/P NEW MOD 45 MIN: CPT | Performed by: INTERNAL MEDICINE

## 2024-05-23 ASSESSMENT — SLEEP AND FATIGUE QUESTIONNAIRES
HOW LIKELY ARE YOU TO NOD OFF OR FALL ASLEEP WHILE SITTING AND READING: WOULD NEVER DOZE
HOW LIKELY ARE YOU TO NOD OFF OR FALL ASLEEP WHEN YOU ARE A PASSENGER IN A CAR FOR AN HOUR WITHOUT A BREAK: WOULD NEVER DOZE
NECK CIRCUMFERENCE (INCHES): 15.75
HOW LIKELY ARE YOU TO NOD OFF OR FALL ASLEEP IN A CAR, WHILE STOPPED FOR A FEW MINUTES IN TRAFFIC: WOULD NEVER DOZE
ESS TOTAL SCORE: 2
HOW LIKELY ARE YOU TO NOD OFF OR FALL ASLEEP WHILE LYING DOWN TO REST IN THE AFTERNOON WHEN CIRCUMSTANCES PERMIT: WOULD NEVER DOZE
HOW LIKELY ARE YOU TO NOD OFF OR FALL ASLEEP WHILE SITTING AND TALKING TO SOMEONE: WOULD NEVER DOZE
HOW LIKELY ARE YOU TO NOD OFF OR FALL ASLEEP WHILE SITTING INACTIVE IN A PUBLIC PLACE: WOULD NEVER DOZE
HOW LIKELY ARE YOU TO NOD OFF OR FALL ASLEEP WHILE WATCHING TV: SLIGHT CHANCE OF DOZING
HOW LIKELY ARE YOU TO NOD OFF OR FALL ASLEEP WHILE SITTING QUIETLY AFTER LUNCH WITHOUT ALCOHOL: SLIGHT CHANCE OF DOZING

## 2024-05-23 NOTE — PROGRESS NOTES
[facility]      Parag Petit MD, FACP, [facility]  Fletcher Castanon MD, [facility]  Good Mejia MD, [facility]  Milly Michelle DO      [address]  [address]  [place] [address]   PH: [phone]  F: [phone]     Subjective:     Patient ID: Hilaria Fonseca is a 33 y.o. female, referred to the sleep center for   Chief Complaint   Patient presents with    E66.01    Z68.41    M79.10   .    Referring physician:  Trina Padilla [facility] Provider      History:has been referred for the JULY    Symptoms:   [x]  Snoring                                                                [x]  Dry Mouth  []  Choking                                                               [x]  Morning Headaches  []  Gasping for Air                                                   []  Trouble Falling asleep  [x]  Tired during the daytime                                    []  Trouble Staying Asleep  [x]  Tired when you wake up                                    [x]  Weight Gain in Last 5 Years  [x]  Wake up frequently at night                                []  Weight Loss in Last 5 Years  []  Shortness Of Breath                                            []  Shift Worker  []  Coughing                                                             []  Smoker (Previous or Current)  []  Chest Pain                                                         [x]  Anxiety  []  Trouble keeping your legs still at night                 [x]  Depression  []  Kicking your legs in your sleep                            []  Insomnia     []  Palpitation  []  Stop breathing      []  Other:     Significant Co-morbidities:  []  Congestive Heart Failure     []  COPD         []  Stroke (Past 30 Days)      []  Supplemental Oxygen Usage       []  Cognitive Impairment      []  Neuromuscular Problems  []  Epilepsy/Neurological Disorders           Social History     Socioeconomic History    Marital

## 2024-06-10 ENCOUNTER — HOSPITAL ENCOUNTER (OUTPATIENT)
Dept: SLEEP CENTER | Age: 34
Discharge: HOME | End: 2024-06-10
Payer: MEDICAID

## 2024-06-10 DIAGNOSIS — G47.33 OSA (OBSTRUCTIVE SLEEP APNEA): ICD-10-CM

## 2024-06-10 PROCEDURE — G0399 HOME SLEEP TEST/TYPE 3 PORTA: HCPCS

## 2024-06-10 NOTE — PROGRESS NOTES
Hilaria Fonseca  1990  arrived at Sleep Center on 6/10/2024 for set up and instruction of [facility].  she was instructed on proper set-up and operation of HST unit. Written instructions with set up diagram given for reference and reinforcement of verbal instruction and demonstration. she was able to return demonstration appropriately. No home environment, vision, dexterity, comprehension concerns with this patient based on completed forms and patient interactions. Patient will return unit after one night as instructed.    Electronically signed by Casey Dehspande on 6/10/2024 at 3:42 PM

## 2024-06-26 ENCOUNTER — HOSPITAL ENCOUNTER (OUTPATIENT)
Dept: SLEEP CENTER | Age: 34
Discharge: HOME | End: 2024-06-26
Payer: MEDICAID

## 2024-06-26 DIAGNOSIS — G47.10 HYPERSOMNIA: ICD-10-CM

## 2024-06-26 DIAGNOSIS — G47.33 OSA (OBSTRUCTIVE SLEEP APNEA): Primary | ICD-10-CM

## 2024-06-26 DIAGNOSIS — E66.01 MORBID OBESITY WITH BMI OF 40.0-44.9, ADULT (HCC): ICD-10-CM

## 2024-06-26 PROCEDURE — 99211 OFF/OP EST MAY X REQ PHY/QHP: CPT

## 2024-06-26 PROCEDURE — 99214 OFFICE O/P EST MOD 30 MIN: CPT | Performed by: INTERNAL MEDICINE

## 2024-06-26 ASSESSMENT — ENCOUNTER SYMPTOMS
EYE ITCHING: 0
BACK PAIN: 0
COUGH: 0
EYE DISCHARGE: 0
SHORTNESS OF BREATH: 0
ABDOMINAL PAIN: 0
ABDOMINAL DISTENTION: 0

## 2024-06-26 NOTE — PROGRESS NOTES
Hilaria Fonseca  1990  Referring Provider: Mariela Jurado APRN - [facility] - General     Subjective:     Chief Complaint   Patient presents with    G47.33       HPI  Hilaria is a 34 y.o. female has come back as a follow up. She had a HST done on 06/10/24 which showed that she has mild JULY with an AHI of 5 and desat to 84.9%. Her sleep efficiency is 83.1%. She has no loss of weight. She is still sleepy and tired during the day time.    Current Outpatient Medications   Medication Sig Dispense Refill    EPINEPHrine 0.1 MG/ML injection Inject into the skin as needed      amitriptyline (ELAVIL) 10 MG tablet Take 1 tablet by mouth nightly      fluticasone (FLONASE) 50 MCG/ACT nasal spray 1 spray by Each Nostril route daily       No current facility-administered medications for this encounter.       Allergies   Allergen Reactions    Bee Venom Swelling    Dilaudid [Hydromorphone] Other (See Comments)     bradycardia    Red Dye Swelling       Past Medical History:   Diagnosis Date    Abnormal Pap smear of cervix     Anxiety     GERD (gastroesophageal reflux disease)     Gestational hypertension     History of trichomoniasis     Migraine     Obesity     Pelvic pain     Postpartum depression        History reviewed. No pertinent surgical history.    Social History     Socioeconomic History    Marital status: Single     Spouse name: None    Number of children: None    Years of education: None    Highest education level: None   Tobacco Use    Smoking status: Never    Smokeless tobacco: Never   Vaping Use    Vaping Use: Never used   Substance and Sexual Activity    Alcohol use: No    Drug use: No    Sexual activity: Yes     Partners: Male     Social Determinants of Health     Financial Resource Strain: Medium Risk (8/3/2022)    Overall Financial Resource Strain (CARDIA)     Difficulty of Paying Living Expenses: Somewhat hard   Food Insecurity: No Food Insecurity (8/3/2022)    Hunger Vital Sign     Worried About Running Out

## 2024-06-27 ENCOUNTER — TELEPHONE (OUTPATIENT)
Dept: PULMONOLOGY | Age: 34
End: 2024-06-27

## 2024-07-03 LAB
A/G RATIO: 1.5 RATIO (ref 0.8–2.6)
ALBUMIN: 4.3 G/DL (ref 3.5–5.2)
ALBUMIN: 4.3 G/DL (ref 3.5–5.2)
ALP BLD-CCNC: 136 U/L (ref 23–144)
ALT SERPL-CCNC: 18 U/L (ref 0–60)
AST SERPL-CCNC: 11 U/L (ref 0–55)
BILIRUB SERPL-MCNC: 0.6 MG/DL (ref 0–1.2)
BILIRUBIN DIRECT: <0.2 MG/DL (ref 0–0.4)
BILIRUBIN, INDIRECT: NORMAL MG/DL (ref 0–1.2)
BUN / CREAT RATIO: 18 (ref 7–25)
BUN BLDV-MCNC: 14 MG/DL (ref 3–29)
C REACTIVE PROTEIN (CRP), BODY FLUID: 1.19 MG/DL
CALCIUM SERPL-MCNC: 9.5 MG/DL (ref 8.5–10.5)
CHLORIDE BLD-SCNC: 102 MEQ/L (ref 96–110)
CO2: 24 MEQ/L (ref 19–32)
CREAT SERPL-MCNC: 0.8 MG/DL (ref 0.5–1.2)
CYCLIC CITRULLINATED PEPTIDE ANTIBODY IGG: <20 EIA
ESTIMATED GLOMERULAR FILTRATION RATE CREATININE EQUATION: 100 MLS/MIN/1.73M2
FASTING STATUS: ABNORMAL
GLOBULIN: 2.8 G/DL (ref 1.9–3.6)
GLUCOSE BLD-MCNC: 102 MG/DL (ref 70–99)
HAV IGM SER IA-ACNC: NEGATIVE
HCT VFR BLD CALC: 40.2 % (ref 34–49)
HEMOGLOBIN: 13.2 G/DL (ref 11.2–15.7)
HEPATITIS B CORE IGM ANTIBODY: NEGATIVE
HEPATITIS B SURF AG,XHBAGS: NEGATIVE
HEPATITIS C VIRUS RNA SER/PLAS NCNC: NEGATIVE
MCH RBC QN AUTO: 28.5 PG (ref 26–34)
MCHC RBC AUTO-ENTMCNC: 32.8 G/DL (ref 30.7–35.5)
MCV RBC AUTO: 86.8 FL (ref 80–100)
PDW BLD-RTO: 13.2 %
PHOSPHORUS: 2.7 MG/DL (ref 2.5–4.5)
PLATELET # BLD: 338 K/UL (ref 140–400)
PMV BLD AUTO: 11.2 FL (ref 7.2–11.7)
POTASSIUM SERPL-SCNC: 4 MEQ/L (ref 3.4–5.3)
RBC # BLD: 4.63 M/UL (ref 3.95–5.26)
RHEUMATOID FACTOR: 18 IU/ML
SED RATE, AUTOMATED: 43 MM/HR (ref 0–20)
SODIUM BLD-SCNC: 139 MEQ/L (ref 135–148)
TOTAL PROTEIN: 7.1 G/DL (ref 6–8.3)
URIC ACID: 4.1 MG/DL (ref 2.5–7)
VITAMIN D 25-HYDROXY: 23 NG/ML (ref 30–100)
WBC # BLD: 7.6 K/UL (ref 3.5–10.9)

## 2024-07-07 LAB
INTERPRETATION: NEGATIVE
QUANTIFERON (R) TB GOLD (INCUBATED): <0.35 IU/ML

## 2024-07-12 ENCOUNTER — OFFICE VISIT (OUTPATIENT)
Dept: RHEUMATOLOGY | Age: 34
End: 2024-07-12
Payer: MEDICAID

## 2024-07-12 VITALS
WEIGHT: 251.4 LBS | HEIGHT: 63 IN | DIASTOLIC BLOOD PRESSURE: 74 MMHG | RESPIRATION RATE: 17 BRPM | BODY MASS INDEX: 44.54 KG/M2 | SYSTOLIC BLOOD PRESSURE: 114 MMHG | OXYGEN SATURATION: 97 % | HEART RATE: 74 BPM

## 2024-07-12 DIAGNOSIS — Z51.81 ENCOUNTER FOR MONITORING OF HYDROXYCHLOROQUINE THERAPY: ICD-10-CM

## 2024-07-12 DIAGNOSIS — Z79.899 ENCOUNTER FOR MONITORING OF HYDROXYCHLOROQUINE THERAPY: ICD-10-CM

## 2024-07-12 DIAGNOSIS — R79.82 ELEVATED C-REACTIVE PROTEIN (CRP): ICD-10-CM

## 2024-07-12 DIAGNOSIS — M05.79 RHEUMATOID ARTHRITIS INVOLVING MULTIPLE SITES WITH POSITIVE RHEUMATOID FACTOR (HCC): Primary | ICD-10-CM

## 2024-07-12 PROCEDURE — G8417 CALC BMI ABV UP PARAM F/U: HCPCS | Performed by: STUDENT IN AN ORGANIZED HEALTH CARE EDUCATION/TRAINING PROGRAM

## 2024-07-12 PROCEDURE — G8427 DOCREV CUR MEDS BY ELIG CLIN: HCPCS | Performed by: STUDENT IN AN ORGANIZED HEALTH CARE EDUCATION/TRAINING PROGRAM

## 2024-07-12 PROCEDURE — 1036F TOBACCO NON-USER: CPT | Performed by: STUDENT IN AN ORGANIZED HEALTH CARE EDUCATION/TRAINING PROGRAM

## 2024-07-12 PROCEDURE — 99214 OFFICE O/P EST MOD 30 MIN: CPT | Performed by: STUDENT IN AN ORGANIZED HEALTH CARE EDUCATION/TRAINING PROGRAM

## 2024-07-12 RX ORDER — HYDROXYCHLOROQUINE SULFATE 200 MG/1
200 TABLET, FILM COATED ORAL 2 TIMES DAILY
Qty: 180 TABLET | Refills: 0 | Status: SHIPPED | OUTPATIENT
Start: 2024-07-12

## 2024-07-12 NOTE — PATIENT INSTRUCTIONS
Start Plaquenil 1 tab twice daily   Get CRP lab one week to your next appointment  RTC in 3 months

## 2024-07-23 ENCOUNTER — TELEPHONE (OUTPATIENT)
Dept: RHEUMATOLOGY | Age: 34
End: 2024-07-23

## 2024-07-23 NOTE — TELEPHONE ENCOUNTER
Pt called in stating that they have been taking Plaquenil for almost two full weeks and has been experiencing new side effects that she is unsure if they are related or not. The pt states that she has had bad nausea, nose bleeds, blood in stool, lower stomach pain, and constipation for a few days. The pt states that she is unsure if these are related side effects or not. The pt is inquiring if these are normal side effects and if she should stop the medication at this time? Please advise

## 2024-08-01 ENCOUNTER — TELEPHONE (OUTPATIENT)
Dept: GASTROENTEROLOGY | Age: 34
End: 2024-08-01

## 2024-08-01 NOTE — TELEPHONE ENCOUNTER
Called pt. In regards to a referral for lower abd pain. Made appt for pt to see dr. Townsend on 8/14/24 @12:30pm

## 2024-08-14 ENCOUNTER — OFFICE VISIT (OUTPATIENT)
Dept: GASTROENTEROLOGY | Age: 34
End: 2024-08-14
Payer: MEDICAID

## 2024-08-14 VITALS
DIASTOLIC BLOOD PRESSURE: 70 MMHG | WEIGHT: 250 LBS | SYSTOLIC BLOOD PRESSURE: 100 MMHG | HEART RATE: 64 BPM | HEIGHT: 63 IN | BODY MASS INDEX: 44.3 KG/M2 | OXYGEN SATURATION: 99 %

## 2024-08-14 DIAGNOSIS — R10.30 LOWER ABDOMINAL PAIN: Primary | ICD-10-CM

## 2024-08-14 DIAGNOSIS — K62.5 RECTAL BLEEDING: ICD-10-CM

## 2024-08-14 DIAGNOSIS — E66.01 MORBID OBESITY WITH BMI OF 40.0-44.9, ADULT (HCC): ICD-10-CM

## 2024-08-14 DIAGNOSIS — K21.9 GASTROESOPHAGEAL REFLUX DISEASE WITHOUT ESOPHAGITIS: ICD-10-CM

## 2024-08-14 PROCEDURE — G8427 DOCREV CUR MEDS BY ELIG CLIN: HCPCS | Performed by: INTERNAL MEDICINE

## 2024-08-14 PROCEDURE — 99204 OFFICE O/P NEW MOD 45 MIN: CPT | Performed by: INTERNAL MEDICINE

## 2024-08-14 PROCEDURE — 1036F TOBACCO NON-USER: CPT | Performed by: INTERNAL MEDICINE

## 2024-08-14 PROCEDURE — G8417 CALC BMI ABV UP PARAM F/U: HCPCS | Performed by: INTERNAL MEDICINE

## 2024-08-14 RX ORDER — HYDROCORTISONE 25 MG/G
CREAM TOPICAL
Qty: 28 G | Refills: 1 | Status: SHIPPED | OUTPATIENT
Start: 2024-08-14

## 2024-08-14 NOTE — PROGRESS NOTES
[facility]             MD Jay Stroud MD Carol Christensen, APRN-CNP       Anastasiya Fraire, APRN-CNP             [address] [facility] [address] [place] [address] [phone] fax [phone]        Gastroenterology Clinic Consultation    Jay Townsend MD  Encounter Date: 08/14/24     CC: New Patient (Pt is here today for lower abdominal pain. )       Mariela Jurado, APRN - CNP  [address] [place] [address]     History obtained from: patient, family, medical records     Subjective:       Hilaria Fonseca is an 34 y.o. female with past history of GERD, JULY, obesity who presents for New Patient (Pt is here today for lower abdominal pain. )  .  According to the patient she has been having lower abdominal pain mainly in the suprapubic area. She mentions she has had intermittent episodes of pain for many years but most recently 4-5 months ago it came back. She mentions nothing seems to make it better, pain is worse with eating. She mentions that she has had had not diarrhea or constipation but has seen blood coating the stool.  Her most recent testing done on 07/2/24 LFT and cbc were normal.   She mentions she had a colonoscopy done 3 years ago at Dr. Dong office and she mentions that everything was normal. She mentions that rectal bleeding started a few weeks ago. It is intermittent. Pain is currently 3/10 but at times can be 7/10. She mentions she has seen a gynecologist and she denies burning micturation.       Patient Active Problem List   Diagnosis    GERD (gastroesophageal reflux disease)    Migraine    Migraine    LGSIL on Pap smear of cervix    HPV in female    JULY (obstructive sleep apnea)    Hypersomnia    Morbid obesity with BMI of 40.0-44.9, adult (HCC)         Past Medical History:   Diagnosis Date    Abnormal Pap smear of cervix     Anxiety     GERD (gastroesophageal reflux disease)     Gestational

## 2024-08-19 ENCOUNTER — HOSPITAL ENCOUNTER (OUTPATIENT)
Dept: INFUSION THERAPY | Age: 34
Discharge: HOME | End: 2024-08-19
Payer: MEDICAID

## 2024-08-19 ENCOUNTER — INITIAL CONSULT (OUTPATIENT)
Dept: ONCOLOGY | Age: 34
End: 2024-08-19
Payer: MEDICAID

## 2024-08-19 VITALS
OXYGEN SATURATION: 99 % | TEMPERATURE: 97.7 F | BODY MASS INDEX: 44.72 KG/M2 | HEIGHT: 63 IN | SYSTOLIC BLOOD PRESSURE: 135 MMHG | DIASTOLIC BLOOD PRESSURE: 70 MMHG | HEART RATE: 71 BPM | WEIGHT: 252.4 LBS

## 2024-08-19 DIAGNOSIS — D68.9 COAGULOPATHY (HCC): Primary | ICD-10-CM

## 2024-08-19 DIAGNOSIS — D68.9 COAGULOPATHY (HCC): ICD-10-CM

## 2024-08-19 DIAGNOSIS — R04.0 NASAL BLEEDING: ICD-10-CM

## 2024-08-19 LAB
ALBUMIN SERPL-MCNC: 4.3 GM/DL (ref 3.4–5)
ALP BLD-CCNC: 118 IU/L (ref 40–129)
ALT SERPL-CCNC: 14 U/L (ref 10–40)
ANION GAP SERPL CALCULATED.3IONS-SCNC: 12 MMOL/L (ref 7–16)
APTT: 25.7 SECONDS (ref 25.1–37.1)
AST SERPL-CCNC: 14 IU/L (ref 15–37)
BASOPHILS ABSOLUTE: 0 K/CU MM
BASOPHILS RELATIVE PERCENT: 0.9 % (ref 0–1)
BILIRUB SERPL-MCNC: 0.4 MG/DL (ref 0–1)
BUN SERPL-MCNC: 17 MG/DL (ref 6–23)
CALCIUM SERPL-MCNC: 9 MG/DL (ref 8.3–10.6)
CHLORIDE BLD-SCNC: 106 MMOL/L (ref 99–110)
CO2: 22 MMOL/L (ref 21–32)
CREAT SERPL-MCNC: 0.8 MG/DL (ref 0.6–1.1)
DIFFERENTIAL TYPE: ABNORMAL
EOSINOPHILS ABSOLUTE: 0.1 K/CU MM
EOSINOPHILS RELATIVE PERCENT: 2 % (ref 0–3)
GFR, ESTIMATED: >90 ML/MIN/1.73M2
GLUCOSE SERPL-MCNC: 77 MG/DL (ref 70–99)
HCT VFR BLD CALC: 39.3 % (ref 37–47)
HEMOGLOBIN: 12.9 GM/DL (ref 12.5–16)
INR BLD: 0.9 INDEX
LYMPHOCYTES ABSOLUTE: 1.3 K/CU MM
LYMPHOCYTES RELATIVE PERCENT: 28.9 % (ref 24–44)
MCH RBC QN AUTO: 29.3 PG (ref 27–31)
MCHC RBC AUTO-ENTMCNC: 32.8 % (ref 32–36)
MCV RBC AUTO: 89.1 FL (ref 78–100)
MONOCYTES ABSOLUTE: 0.3 K/CU MM
MONOCYTES RELATIVE PERCENT: 6.7 % (ref 0–4)
NEUTROPHILS ABSOLUTE: 2.8 K/CU MM
NEUTROPHILS RELATIVE PERCENT: 61.5 % (ref 36–66)
PDW BLD-RTO: 13.6 % (ref 11.7–14.9)
PLATELET # BLD: 270 K/CU MM (ref 140–440)
PMV BLD AUTO: 10.4 FL (ref 7.5–11.1)
POTASSIUM SERPL-SCNC: 4.2 MMOL/L (ref 3.5–5.1)
PROTHROMBIN TIME: 13 SECONDS (ref 11.7–14.5)
RBC # BLD: 4.41 M/CU MM (ref 4.2–5.4)
SODIUM BLD-SCNC: 140 MMOL/L (ref 135–145)
TOTAL PROTEIN: 7 GM/DL (ref 6.4–8.2)
TSH SERPL DL<=0.005 MIU/L-ACNC: 1.86 UIU/ML (ref 0.27–4.2)
WBC # BLD: 4.5 K/CU MM (ref 4–10.5)

## 2024-08-19 PROCEDURE — 84155 ASSAY OF PROTEIN SERUM: CPT

## 2024-08-19 PROCEDURE — G8417 CALC BMI ABV UP PARAM F/U: HCPCS | Performed by: INTERNAL MEDICINE

## 2024-08-19 PROCEDURE — 85610 PROTHROMBIN TIME: CPT

## 2024-08-19 PROCEDURE — 85240 CLOT FACTOR VIII AHG 1 STAGE: CPT

## 2024-08-19 PROCEDURE — 80053 COMPREHEN METABOLIC PANEL: CPT

## 2024-08-19 PROCEDURE — 99211 OFF/OP EST MAY X REQ PHY/QHP: CPT

## 2024-08-19 PROCEDURE — 1036F TOBACCO NON-USER: CPT | Performed by: INTERNAL MEDICINE

## 2024-08-19 PROCEDURE — 85730 THROMBOPLASTIN TIME PARTIAL: CPT

## 2024-08-19 PROCEDURE — 99204 OFFICE O/P NEW MOD 45 MIN: CPT | Performed by: INTERNAL MEDICINE

## 2024-08-19 PROCEDURE — G8427 DOCREV CUR MEDS BY ELIG CLIN: HCPCS | Performed by: INTERNAL MEDICINE

## 2024-08-19 PROCEDURE — 85246 CLOT FACTOR VIII VW ANTIGEN: CPT

## 2024-08-19 PROCEDURE — 84443 ASSAY THYROID STIM HORMONE: CPT

## 2024-08-19 PROCEDURE — 85025 COMPLETE CBC W/AUTO DIFF WBC: CPT

## 2024-08-19 PROCEDURE — 85245 CLOT FACTOR VIII VW RISTOCTN: CPT

## 2024-08-19 PROCEDURE — 36415 COLL VENOUS BLD VENIPUNCTURE: CPT

## 2024-08-19 PROCEDURE — 84165 PROTEIN E-PHORESIS SERUM: CPT

## 2024-08-19 ASSESSMENT — PATIENT HEALTH QUESTIONNAIRE - PHQ9
SUM OF ALL RESPONSES TO PHQ QUESTIONS 1-9: 1
SUM OF ALL RESPONSES TO PHQ9 QUESTIONS 1 & 2: 1
SUM OF ALL RESPONSES TO PHQ QUESTIONS 1-9: 1
1. LITTLE INTEREST OR PLEASURE IN DOING THINGS: NOT AT ALL
2. FEELING DOWN, DEPRESSED OR HOPELESS: SEVERAL DAYS
SUM OF ALL RESPONSES TO PHQ QUESTIONS 1-9: 1
SUM OF ALL RESPONSES TO PHQ QUESTIONS 1-9: 1

## 2024-08-19 NOTE — PROGRESS NOTES
MA Rooming Questions  Patient: Hilaria Fonseca  MRN: [id number]    Date: 8/19/2024        NEW PATIENT    5. Did the patient have a depression screening completed today? Yes    PHQ-9 Total Score: 1 (8/19/2024 10:56 AM)       PHQ-9 Given to (if applicable):               PHQ-9 Score (if applicable):                     [] Positive     []  Negative              Does question #9 need addressed (if applicable)                     [] Yes    []  No               Nubia Steinberg, CMA

## 2024-08-19 NOTE — PROGRESS NOTES
Patient Name:  Hilaria Fonseca  Patient :  1990  Patient MRN:  [id number]     Primary Oncologist: Janet Luis MD  Referring Provider: Mariela Jurado APRN - CNP     Date of Service: 2024      Reason for Consult: Excess nosebleed     Chief Complaint:    Chief Complaint   Patient presents with    New Patient       Encounter Diagnoses   Name Primary?    Coagulopathy (HCC) Yes    Nasal bleeding         HPI:  2024: Very pleasant 34-year-old female arrived with her daughter to the clinic today.  Reported that she has been bruising very easily and even spontaneously.  Reported chronic history of nosebleeds, occurs every 2 days and lasts all day.  Reported gingival bleeding as well.  Reported rectal bleeding for the past month or so.  Reported heavy menstrual bleeding for the past several years, associated with clots.  Denies any  bleeding.  Denies any weight loss.  No fever or lymphadenopathy.  Denying chest pain or increased shortness of breath.  Reported lower abdominal pain.  She is being evaluated by GI.    2024 CBC with WBC of 7.6 hemoglobin of 13.2 hematocrit 40, MCV 86.8 and platelets of 338 C-reactive protein is 1.19 rheumatoid factor 18 liver function tests within normal limits uric acid 4.1 creatinine of 0.8 sed rate 43 vitamin D 23 CCP less than 20 hepatitis panel acute negative QuantiFERON gold negative     Past Medical History:     History of rheumatoid arthritis, is on hydroxychloroquine recently started.  She is on amitriptyline for vaginal pain, dryness                                                             Past Surgery History:    None                                                                            Social History:   Lives with family, no history of smoking tobacco, alcohol abuse or any other illicit drug abuse                                                                                                        Family History:    No history of any blood

## 2024-08-20 LAB
ALBUMIN SERPL ELPH-MCNC: 3.8 GM/DL (ref 3.2–5.6)
ALPHA-1-GLOBULIN: 0.3 GM/DL (ref 0.1–0.4)
ALPHA-2-GLOBULIN: 0.8 GM/DL (ref 0.4–1.2)
BETA GLOBULIN: 1 GM/DL (ref 0.5–1.3)
GAMMA GLOBULIN: 1.2 GM/DL (ref 0.5–1.6)
SPEP INTERPRETATION: NORMAL
TOTAL PROTEIN: 7 GM/DL (ref 6.4–8.2)

## 2024-08-22 LAB
FACT VIII ACT/NOR PPP: 133 % (ref 56–191)
VWF AG ACT/NOR PPP IA: 151 % (ref 52–214)
VWF:RCO ACT/NOR PPP PL AGG: 174 % (ref 51–215)

## 2024-08-28 ENCOUNTER — HOSPITAL ENCOUNTER (OUTPATIENT)
Dept: CT IMAGING | Age: 34
Discharge: HOME | End: 2024-08-28
Attending: INTERNAL MEDICINE
Payer: MEDICAID

## 2024-08-28 DIAGNOSIS — R10.30 LOWER ABDOMINAL PAIN: ICD-10-CM

## 2024-08-28 PROCEDURE — 74178 CT ABD&PLV WO CNTR FLWD CNTR: CPT

## 2024-08-28 PROCEDURE — 6360000004 HC RX CONTRAST MEDICATION: Performed by: INTERNAL MEDICINE

## 2024-08-28 RX ORDER — IOPAMIDOL 755 MG/ML
20 INJECTION, SOLUTION INTRAVASCULAR
Status: COMPLETED | OUTPATIENT
Start: 2024-08-28 | End: 2024-08-28

## 2024-08-28 RX ORDER — IOPAMIDOL 755 MG/ML
100 INJECTION, SOLUTION INTRAVASCULAR
Status: COMPLETED | OUTPATIENT
Start: 2024-08-28 | End: 2024-08-28

## 2024-08-28 RX ADMIN — IOPAMIDOL 100 ML: 755 INJECTION, SOLUTION INTRAVENOUS at 15:29

## 2024-08-28 RX ADMIN — IOPAMIDOL 20 ML: 755 INJECTION, SOLUTION INTRAVENOUS at 14:20

## 2024-08-30 NOTE — RESULT ENCOUNTER NOTE
CT shows normal appendix, there was a moderate degree of stool we recommend titrating MiraLAX up to 4 capfuls daily and no other acute abnormality seen

## 2024-09-13 ENCOUNTER — OFFICE VISIT (OUTPATIENT)
Dept: PULMONOLOGY | Age: 34
End: 2024-09-13
Payer: MEDICAID

## 2024-09-13 VITALS
SYSTOLIC BLOOD PRESSURE: 116 MMHG | BODY MASS INDEX: 44.83 KG/M2 | HEIGHT: 63 IN | OXYGEN SATURATION: 98 % | WEIGHT: 253 LBS | HEART RATE: 85 BPM | DIASTOLIC BLOOD PRESSURE: 72 MMHG

## 2024-09-13 DIAGNOSIS — E66.01 MORBID OBESITY WITH BMI OF 40.0-44.9, ADULT (HCC): ICD-10-CM

## 2024-09-13 DIAGNOSIS — G47.33 OSA (OBSTRUCTIVE SLEEP APNEA): Primary | ICD-10-CM

## 2024-09-13 DIAGNOSIS — G47.10 HYPERSOMNIA: ICD-10-CM

## 2024-09-13 PROCEDURE — 99214 OFFICE O/P EST MOD 30 MIN: CPT | Performed by: INTERNAL MEDICINE

## 2024-09-13 PROCEDURE — 1036F TOBACCO NON-USER: CPT | Performed by: INTERNAL MEDICINE

## 2024-09-13 PROCEDURE — G8417 CALC BMI ABV UP PARAM F/U: HCPCS | Performed by: INTERNAL MEDICINE

## 2024-09-13 PROCEDURE — G8427 DOCREV CUR MEDS BY ELIG CLIN: HCPCS | Performed by: INTERNAL MEDICINE

## 2024-09-13 RX ORDER — GABAPENTIN 100 MG/1
100 CAPSULE ORAL 3 TIMES DAILY
COMMUNITY
Start: 2024-09-04 | End: 2025-09-04

## 2024-09-13 RX ORDER — CETIRIZINE HYDROCHLORIDE 10 MG/1
1 TABLET ORAL
COMMUNITY

## 2024-09-13 ASSESSMENT — ENCOUNTER SYMPTOMS
EYE ITCHING: 0
EYE DISCHARGE: 0
ABDOMINAL PAIN: 0
ABDOMINAL DISTENTION: 0
SHORTNESS OF BREATH: 0
BACK PAIN: 0
COUGH: 0

## 2024-09-16 ENCOUNTER — OFFICE VISIT (OUTPATIENT)
Dept: ONCOLOGY | Age: 34
End: 2024-09-16

## 2024-09-16 ENCOUNTER — HOSPITAL ENCOUNTER (OUTPATIENT)
Dept: INFUSION THERAPY | Age: 34
Discharge: HOME | End: 2024-09-16
Payer: MEDICAID

## 2024-09-16 VITALS
OXYGEN SATURATION: 99 % | SYSTOLIC BLOOD PRESSURE: 129 MMHG | TEMPERATURE: 97.7 F | DIASTOLIC BLOOD PRESSURE: 78 MMHG | HEART RATE: 63 BPM | BODY MASS INDEX: 44.51 KG/M2 | HEIGHT: 63 IN | WEIGHT: 251.2 LBS

## 2024-09-16 DIAGNOSIS — R04.0 NASAL BLEEDING: ICD-10-CM

## 2024-09-16 DIAGNOSIS — D68.9 COAGULOPATHY (HCC): Primary | ICD-10-CM

## 2024-09-16 PROCEDURE — 99211 OFF/OP EST MAY X REQ PHY/QHP: CPT

## 2024-10-13 NOTE — PROGRESS NOTES
RHEUMATOLOGY FOLLOW-UP VISIT    10/16/2024      Patient Name: Hilaria Fonseca  : 1990  Medical Record: [id number]      CHIEF COMPLAINT    Seropositive RA    Pertinent Problems  Chronic Migraines    HISTORY OF PRESENT ILLNESS    Hilaria Fonseca is a 34 y.o. female who established on 2024.  Symptom onset began 3/2023 with ankle and feet pain that gradually worsened to become widespread. There is no significant joint swelling. Naproxen did not help her symptoms    LCV: 24  Vitamin D 23 L  ESR 43 H  Creatinine normal   AST normal  ALT normal  ALP normal  RF 18 H  CRP 1.19H  Uric acid normal  WBC/Hb/platelets normal  CCP neg   Acute hepatitis panel neg   QuantiFERON negative  HUEY neg   Other subserologies neg  Plaqeunil was started on 24  Pain level today: 3/10       Subjective:  Plaquenil was poorly tolerated. She experienced nose bleeding, vaginal and rectal bleeding so she stopped.  Bleeding stopped after plaquenil was held and restarted when she resumed plaquenil so she stopped the medication.    There is diffuse pain in multiple joints.  Bilateral feet, ankles and fingers are her worst pain  she tried orthotics that did not help ankle pain  There is mild muscle and joint stiffness that worsens during the day   Sleep study showed JULY she will be getting CPAP machine next week  Associated symptoms: pelvic floor pain/ spasms for which she had PT   Pain level today: 4/10   Hx of bilateral ankle injury in  ( torn ligaments and tendons during volley ball)   No recent hospitalizations or fever/infections  Associated symptoms: SOB+ , muscle pain+        Risk factors: Denies smoking, occasional etoh, +++ obesity, no recreational drug use, no family hx of RA, lupus or CTD      Current rheum meds: gabapentin     Past rheum meds:          No data to display                    REVIEW OF SYSTEMS     Constitutional:  Denies fever or chills, decreased appetite, or weight loss   Eyes:  Denies change in

## 2024-10-16 ENCOUNTER — OFFICE VISIT (OUTPATIENT)
Dept: RHEUMATOLOGY | Age: 34
End: 2024-10-16
Payer: MEDICAID

## 2024-10-16 VITALS
WEIGHT: 248 LBS | HEART RATE: 81 BPM | BODY MASS INDEX: 43.93 KG/M2 | DIASTOLIC BLOOD PRESSURE: 86 MMHG | SYSTOLIC BLOOD PRESSURE: 120 MMHG | OXYGEN SATURATION: 97 %

## 2024-10-16 DIAGNOSIS — M05.79 RHEUMATOID ARTHRITIS INVOLVING MULTIPLE SITES WITH POSITIVE RHEUMATOID FACTOR (HCC): Primary | ICD-10-CM

## 2024-10-16 DIAGNOSIS — Z79.899 HIGH RISK MEDICATION USE: ICD-10-CM

## 2024-10-16 DIAGNOSIS — R79.82 ELEVATED C-REACTIVE PROTEIN (CRP): ICD-10-CM

## 2024-10-16 PROCEDURE — G8484 FLU IMMUNIZE NO ADMIN: HCPCS | Performed by: STUDENT IN AN ORGANIZED HEALTH CARE EDUCATION/TRAINING PROGRAM

## 2024-10-16 PROCEDURE — G8427 DOCREV CUR MEDS BY ELIG CLIN: HCPCS | Performed by: STUDENT IN AN ORGANIZED HEALTH CARE EDUCATION/TRAINING PROGRAM

## 2024-10-16 PROCEDURE — 99215 OFFICE O/P EST HI 40 MIN: CPT | Performed by: STUDENT IN AN ORGANIZED HEALTH CARE EDUCATION/TRAINING PROGRAM

## 2024-10-16 PROCEDURE — G8417 CALC BMI ABV UP PARAM F/U: HCPCS | Performed by: STUDENT IN AN ORGANIZED HEALTH CARE EDUCATION/TRAINING PROGRAM

## 2024-10-16 PROCEDURE — 1036F TOBACCO NON-USER: CPT | Performed by: STUDENT IN AN ORGANIZED HEALTH CARE EDUCATION/TRAINING PROGRAM

## 2024-10-16 RX ORDER — ESCITALOPRAM OXALATE 10 MG/1
TABLET ORAL
COMMUNITY
Start: 2024-09-17

## 2024-10-16 RX ORDER — PHENTERMINE HYDROCHLORIDE 37.5 MG/1
CAPSULE ORAL
COMMUNITY
Start: 2024-09-22

## 2024-10-16 RX ORDER — SULFASALAZINE 500 MG/1
TABLET, DELAYED RELEASE ORAL
Qty: 120 TABLET | Refills: 1 | Status: SHIPPED | OUTPATIENT
Start: 2024-10-16

## 2024-10-16 NOTE — PATIENT INSTRUCTIONS
Patient Instructions  Stop plaquenil  Start sulfasalazine 1 tab 2x daily for 2 weeks, then increase to 2 tabs 2x daily, if well tolerated  Get CRP lab one week to your next appt  RTC in 2 months

## 2025-01-08 ENCOUNTER — PATIENT MESSAGE (OUTPATIENT)
Dept: ONCOLOGY | Age: 35
End: 2025-01-08

## 2025-01-13 ENCOUNTER — HOSPITAL ENCOUNTER (OUTPATIENT)
Dept: INFUSION THERAPY | Age: 35
Discharge: HOME | End: 2025-01-13
Payer: MEDICAID

## 2025-01-13 DIAGNOSIS — R04.0 NASAL BLEEDING: ICD-10-CM

## 2025-01-13 DIAGNOSIS — D68.9 COAGULOPATHY (HCC): ICD-10-CM

## 2025-01-13 LAB
ALBUMIN SERPL-MCNC: 4.1 G/DL (ref 3.4–5)
ALBUMIN/GLOB SERPL: 1.6 {RATIO} (ref 1.1–2.2)
ALP SERPL-CCNC: 114 U/L (ref 40–129)
ALT SERPL-CCNC: 23 U/L (ref 10–40)
ANION GAP SERPL CALCULATED.3IONS-SCNC: 10 MMOL/L (ref 9–17)
AST SERPL-CCNC: 18 U/L (ref 15–37)
BASOPHILS # BLD: 0.03 K/UL
BASOPHILS NFR BLD: 1 % (ref 0–1)
BILIRUB SERPL-MCNC: 0.4 MG/DL (ref 0–1)
BUN SERPL-MCNC: 24 MG/DL (ref 7–20)
CALCIUM SERPL-MCNC: 9.1 MG/DL (ref 8.3–10.6)
CHLORIDE SERPL-SCNC: 107 MMOL/L (ref 99–110)
CO2 SERPL-SCNC: 25 MMOL/L (ref 21–32)
CREAT SERPL-MCNC: 0.8 MG/DL (ref 0.6–1.1)
EOSINOPHIL # BLD: 0.12 K/UL
EOSINOPHILS RELATIVE PERCENT: 2 % (ref 0–3)
ERYTHROCYTE [DISTWIDTH] IN BLOOD BY AUTOMATED COUNT: 14.3 % (ref 11.7–14.9)
GFR, ESTIMATED: 87 ML/MIN/1.73M2
GLUCOSE SERPL-MCNC: 91 MG/DL (ref 74–99)
HCT VFR BLD AUTO: 37.1 % (ref 37–47)
HGB BLD-MCNC: 12 G/DL (ref 12.5–16)
LYMPHOCYTES NFR BLD: 1.25 K/UL
LYMPHOCYTES RELATIVE PERCENT: 23 % (ref 24–44)
MCH RBC QN AUTO: 29.5 PG (ref 27–31)
MCHC RBC AUTO-ENTMCNC: 32.3 G/DL (ref 32–36)
MCV RBC AUTO: 91.2 FL (ref 78–100)
MONOCYTES NFR BLD: 0.4 K/UL
MONOCYTES NFR BLD: 7 % (ref 0–4)
NEUTROPHILS NFR BLD: 67 % (ref 36–66)
NEUTS SEG NFR BLD: 3.58 K/UL
PLATELET # BLD AUTO: 259 K/UL (ref 140–440)
PMV BLD AUTO: 10.3 FL (ref 7.5–11.1)
POTASSIUM SERPL-SCNC: 4 MMOL/L (ref 3.5–5.1)
PROT SERPL-MCNC: 6.7 G/DL (ref 6.4–8.2)
RBC # BLD AUTO: 4.07 M/UL (ref 4.2–5.4)
SODIUM SERPL-SCNC: 142 MMOL/L (ref 136–145)
WBC OTHER # BLD: 5.4 K/UL (ref 4–10.5)

## 2025-01-13 PROCEDURE — 85246 CLOT FACTOR VIII VW ANTIGEN: CPT

## 2025-01-13 PROCEDURE — 85245 CLOT FACTOR VIII VW RISTOCTN: CPT

## 2025-01-13 PROCEDURE — 80053 COMPREHEN METABOLIC PANEL: CPT

## 2025-01-13 PROCEDURE — 36415 COLL VENOUS BLD VENIPUNCTURE: CPT

## 2025-01-13 PROCEDURE — 85025 COMPLETE CBC W/AUTO DIFF WBC: CPT

## 2025-01-13 PROCEDURE — 85240 CLOT FACTOR VIII AHG 1 STAGE: CPT

## 2025-01-15 LAB
FACTOR VIII ACTIVITY: 230 % (ref 56–191)
VWF AG ACT/NOR PPP IA: 187 % (ref 52–214)
VWF:RCO ACT/NOR PPP PL AGG: 196 % (ref 51–215)

## 2025-01-16 ENCOUNTER — OFFICE VISIT (OUTPATIENT)
Dept: ONCOLOGY | Age: 35
End: 2025-01-16
Payer: MEDICAID

## 2025-01-16 ENCOUNTER — HOSPITAL ENCOUNTER (OUTPATIENT)
Dept: INFUSION THERAPY | Age: 35
Discharge: HOME | End: 2025-01-16
Payer: MEDICAID

## 2025-01-16 VITALS
WEIGHT: 237.8 LBS | OXYGEN SATURATION: 98 % | SYSTOLIC BLOOD PRESSURE: 121 MMHG | HEART RATE: 88 BPM | HEIGHT: 63 IN | BODY MASS INDEX: 42.13 KG/M2 | RESPIRATION RATE: 18 BRPM | TEMPERATURE: 97.4 F | DIASTOLIC BLOOD PRESSURE: 71 MMHG

## 2025-01-16 DIAGNOSIS — D68.9 COAGULOPATHY (HCC): Primary | ICD-10-CM

## 2025-01-16 DIAGNOSIS — R04.0 NASAL BLEEDING: ICD-10-CM

## 2025-01-16 PROCEDURE — 1036F TOBACCO NON-USER: CPT | Performed by: INTERNAL MEDICINE

## 2025-01-16 PROCEDURE — 99213 OFFICE O/P EST LOW 20 MIN: CPT | Performed by: INTERNAL MEDICINE

## 2025-01-16 PROCEDURE — G8417 CALC BMI ABV UP PARAM F/U: HCPCS | Performed by: INTERNAL MEDICINE

## 2025-01-16 PROCEDURE — 99211 OFF/OP EST MAY X REQ PHY/QHP: CPT

## 2025-01-16 PROCEDURE — G8427 DOCREV CUR MEDS BY ELIG CLIN: HCPCS | Performed by: INTERNAL MEDICINE

## 2025-01-16 ASSESSMENT — PATIENT HEALTH QUESTIONNAIRE - PHQ9
2. FEELING DOWN, DEPRESSED OR HOPELESS: SEVERAL DAYS
1. LITTLE INTEREST OR PLEASURE IN DOING THINGS: SEVERAL DAYS
SUM OF ALL RESPONSES TO PHQ QUESTIONS 1-9: 2
SUM OF ALL RESPONSES TO PHQ9 QUESTIONS 1 & 2: 2

## 2025-01-16 NOTE — PROGRESS NOTES
MA Rooming Questions  Patient: Hilaria Fonseca  MRN: [id number]    Date: 1/16/2025        1. Do you have any new issues?   no         2. Do you need any refills on medications?    no    3. Have you had any imaging done since your last visit?   no    4. Have you been hospitalized or seen in the emergency room since your last visit here?   no    5. Did the patient have a depression screening completed today? Yes    No data recorded     PHQ-9 Given to (if applicable):               PHQ-9 Score (if applicable):                     [] Positive     []  Negative              Does question #9 need addressed (if applicable)                     [] Yes    []  No               Priya Dumont CMA      
JVD  HEMATOLOGIC/LYMPHATIC: no cervical, supraclavicular or axillary lymphadenopathy   LUNGS: CTAB  CARDIOVASCULAR: s1s2 rrr no murmurs  ABDOMEN: soft nd bs pos MILD TTP UPPER ABD  MUSCULOSKELETAL: full range of motion noted, tone is normal  NEUROLOGIC: GI  SKIN:FEW BRUISES  EXTREMITIES: no LE edema bilaterally     Labs:  Hematology:  Lab Results   Component Value Date    WBC 5.4 01/13/2025    RBC 4.07 (L) 01/13/2025    HGB 12.0 (L) 01/13/2025    HCT 37.1 01/13/2025    MCV 91.2 01/13/2025    MCH 29.5 01/13/2025    MCHC 32.3 01/13/2025    RDW 14.3 01/13/2025     01/13/2025    MPV 10.3 01/13/2025    BASOPCT 1 01/13/2025    LYMPHOPCT 23 (L) 01/13/2025    MONOPCT 7 (H) 01/13/2025    EOSABS 0.12 01/13/2025    BASOSABS 0.03 01/13/2025    LYMPHSABS 1.25 01/13/2025    MONOSABS 0.40 01/13/2025    DIFFTYPE AUTOMATED DIFFERENTIAL 08/19/2024     No results found for: \"ESR\"  Chemistry:  Lab Results   Component Value Date     01/13/2025    K 4.0 01/13/2025     01/13/2025    CO2 25 01/13/2025    BUN 24 (H) 01/13/2025    CREATININE 0.8 01/13/2025    GLUCOSE 91 01/13/2025    CALCIUM 9.1 01/13/2025    BILITOT 0.4 01/13/2025    ALKPHOS 114 01/13/2025    AST 18 01/13/2025    ALT 23 01/13/2025    LABGLOM 87 01/13/2025    GFRAA >60 08/01/2022    AGRATIO 1.5 07/02/2024    GLOB 2.8 07/02/2024    PHOS 2.7 07/02/2024     No results found for: \"MMA\", \"LDH\", \"HOMOCYSTEINE\"  No results found for: \"LD\"  Lab Results   Component Value Date    TSHHS 1.860 08/19/2024     Immunology:  Lab Results   Component Value Date    SPEP INTERPRETATION - Within normal limits. SAF 08/19/2024    ALBUMINELP 3.8 08/19/2024    GAMGLOB 1.2 08/19/2024     No results found for: \"KAPPAUVOL\", \"LAMBDAUVOL\", \"KLFLCR\"  No results found for: \"B2M\"  Coagulation Panel:  Lab Results   Component Value Date    PROTIME 13.0 08/19/2024    INR 0.9 08/19/2024    APTT 25.7 08/19/2024     Anemia Panel:  No results found for: \"UIYDMDWT56\", \"FOLATE\"  Tumor Markers:  No

## 2025-02-07 ENCOUNTER — HOSPITAL ENCOUNTER (EMERGENCY)
Age: 35
Discharge: HOME | End: 2025-02-07
Payer: MEDICAID

## 2025-02-07 ENCOUNTER — APPOINTMENT (OUTPATIENT)
Dept: CT IMAGING | Age: 35
End: 2025-02-07
Payer: MEDICAID

## 2025-02-07 VITALS
OXYGEN SATURATION: 98 % | HEART RATE: 70 BPM | SYSTOLIC BLOOD PRESSURE: 111 MMHG | RESPIRATION RATE: 16 BRPM | HEIGHT: 63 IN | WEIGHT: 237 LBS | TEMPERATURE: 98.1 F | BODY MASS INDEX: 41.99 KG/M2 | DIASTOLIC BLOOD PRESSURE: 63 MMHG

## 2025-02-07 DIAGNOSIS — R74.8 ELEVATED ALKALINE PHOSPHATASE LEVEL: ICD-10-CM

## 2025-02-07 DIAGNOSIS — R10.9 LEFT FLANK PAIN: Primary | ICD-10-CM

## 2025-02-07 LAB
ALBUMIN SERPL-MCNC: 4.1 G/DL (ref 3.4–5)
ALBUMIN/GLOB SERPL: 1.4 {RATIO} (ref 1.1–2.2)
ALP SERPL-CCNC: 141 U/L (ref 40–129)
ALT SERPL-CCNC: 17 U/L (ref 10–40)
ANION GAP SERPL CALCULATED.3IONS-SCNC: 11 MMOL/L (ref 9–17)
AST SERPL-CCNC: 19 U/L (ref 15–37)
BASOPHILS # BLD: 0.08 K/UL
BASOPHILS NFR BLD: 1 % (ref 0–1)
BILIRUB SERPL-MCNC: 0.3 MG/DL (ref 0–1)
BILIRUB UR QL STRIP: NEGATIVE
BUN SERPL-MCNC: 20 MG/DL (ref 7–20)
CALCIUM SERPL-MCNC: 8.9 MG/DL (ref 8.3–10.6)
CHLORIDE SERPL-SCNC: 104 MMOL/L (ref 99–110)
CLARITY UR: CLEAR
CO2 SERPL-SCNC: 24 MMOL/L (ref 21–32)
COLOR UR: YELLOW
COMMENT: ABNORMAL
CREAT SERPL-MCNC: 0.8 MG/DL (ref 0.6–1.1)
EOSINOPHIL # BLD: 0.1 K/UL
EOSINOPHILS RELATIVE PERCENT: 1 % (ref 0–3)
ERYTHROCYTE [DISTWIDTH] IN BLOOD BY AUTOMATED COUNT: 13 % (ref 11.7–14.9)
GFR, ESTIMATED: 82 ML/MIN/1.73M2
GLUCOSE SERPL-MCNC: 92 MG/DL (ref 74–99)
GLUCOSE UR STRIP-MCNC: NEGATIVE MG/DL
HCG UR QL: NEGATIVE
HCT VFR BLD AUTO: 39.4 % (ref 37–47)
HGB BLD-MCNC: 12.9 G/DL (ref 12.5–16)
HGB UR QL STRIP.AUTO: NEGATIVE
IMM GRANULOCYTES # BLD AUTO: 0.02 K/UL
IMM GRANULOCYTES NFR BLD: 0 %
KETONES UR STRIP-MCNC: NEGATIVE MG/DL
LEUKOCYTE ESTERASE UR QL STRIP: NEGATIVE
LYMPHOCYTES NFR BLD: 1.94 K/UL
LYMPHOCYTES RELATIVE PERCENT: 27 % (ref 24–44)
MCH RBC QN AUTO: 29.1 PG (ref 27–31)
MCHC RBC AUTO-ENTMCNC: 32.7 G/DL (ref 32–36)
MCV RBC AUTO: 88.7 FL (ref 78–100)
MONOCYTES NFR BLD: 0.55 K/UL
MONOCYTES NFR BLD: 8 % (ref 0–4)
NEUTROPHILS NFR BLD: 62 % (ref 36–66)
NEUTS SEG NFR BLD: 4.42 K/UL
NITRITE UR QL STRIP: NEGATIVE
PH UR STRIP: 6 [PH] (ref 5–8)
PLATELET # BLD AUTO: 309 K/UL (ref 140–440)
PMV BLD AUTO: 10.4 FL (ref 7.5–11.1)
POTASSIUM SERPL-SCNC: 4 MMOL/L (ref 3.5–5.1)
PROT SERPL-MCNC: 7 G/DL (ref 6.4–8.2)
PROT UR STRIP-MCNC: NEGATIVE MG/DL
RBC # BLD AUTO: 4.44 M/UL (ref 4.2–5.4)
SODIUM SERPL-SCNC: 139 MMOL/L (ref 136–145)
SP GR UR STRIP: >1.03 (ref 1–1.03)
UROBILINOGEN UR STRIP-ACNC: 0.2 EU/DL (ref 0–1)
WBC OTHER # BLD: 7.1 K/UL (ref 4–10.5)

## 2025-02-07 PROCEDURE — 99285 EMERGENCY DEPT VISIT HI MDM: CPT

## 2025-02-07 PROCEDURE — 81003 URINALYSIS AUTO W/O SCOPE: CPT

## 2025-02-07 PROCEDURE — 80053 COMPREHEN METABOLIC PANEL: CPT

## 2025-02-07 PROCEDURE — 84703 CHORIONIC GONADOTROPIN ASSAY: CPT

## 2025-02-07 PROCEDURE — 6360000004 HC RX CONTRAST MEDICATION: Performed by: PHYSICIAN ASSISTANT

## 2025-02-07 PROCEDURE — 85025 COMPLETE CBC W/AUTO DIFF WBC: CPT

## 2025-02-07 PROCEDURE — 74177 CT ABD & PELVIS W/CONTRAST: CPT

## 2025-02-07 RX ORDER — KETOROLAC TROMETHAMINE 15 MG/ML
30 INJECTION, SOLUTION INTRAMUSCULAR; INTRAVENOUS
Status: DISCONTINUED | OUTPATIENT
Start: 2025-02-07 | End: 2025-02-08 | Stop reason: HOSPADM

## 2025-02-07 RX ORDER — IOPAMIDOL 755 MG/ML
75 INJECTION, SOLUTION INTRAVASCULAR
Status: COMPLETED | OUTPATIENT
Start: 2025-02-07 | End: 2025-02-07

## 2025-02-07 RX ORDER — ONDANSETRON 2 MG/ML
4 INJECTION INTRAMUSCULAR; INTRAVENOUS
Status: DISCONTINUED | OUTPATIENT
Start: 2025-02-07 | End: 2025-02-08 | Stop reason: HOSPADM

## 2025-02-07 RX ADMIN — IOPAMIDOL 75 ML: 755 INJECTION, SOLUTION INTRAVENOUS at 21:38

## 2025-02-07 ASSESSMENT — PAIN SCALES - GENERAL
PAINLEVEL_OUTOF10: 7
PAINLEVEL_OUTOF10: 5

## 2025-02-07 ASSESSMENT — PAIN DESCRIPTION - LOCATION
LOCATION: BACK
LOCATION: BACK

## 2025-02-07 ASSESSMENT — PAIN - FUNCTIONAL ASSESSMENT: PAIN_FUNCTIONAL_ASSESSMENT: 0-10

## 2025-02-07 ASSESSMENT — PAIN DESCRIPTION - DESCRIPTORS: DESCRIPTORS: SHARP

## 2025-02-07 ASSESSMENT — PAIN DESCRIPTION - ORIENTATION: ORIENTATION: LEFT;POSTERIOR;LOWER

## 2025-02-08 NOTE — ED TRIAGE NOTES
Patient to ED for flank pain on L side that radaites to tailbone x1 week. Patient endorses discomfort in back during urination. Patient is also c/o nausea.

## 2025-02-08 NOTE — ED PROVIDER NOTES
[facility] EMERGENCY DEPARTMENT  EMERGENCY DEPARTMENT ENCOUNTER        Pt Name: Hilaria Fonseca  MRN: [id number]  Birthdate 1990  Date of evaluation: 2/7/2025  Provider: Elijah Mario PA-C  PCP: Mariela Jurado APRN - CNP      {Shared Not Shared:13638}      CHIEF COMPLAINT      Chief Complaint   Patient presents with    Flank Pain     Pt presents to the ed with complaints of flank pain on the left side. States that when she urinates she gets really sharp pain on the left side only. Feels as if she is not completely emptying when she urinates as well. No dysuria at this time.        HISTORY OF PRESENT ILLNESS:     {History from (Optional):72914::\"Patient\"}    {Limitations to history (Optional):29067::\"None\"}    Hilaria Fonseca is a 34 y.o. female who presents with left flank pain x 1 week.  Today had noticed worsening sharp pain after urinating that had persisted.  No relief with ibuprofen, stretching. Feels urinary frequency , nausea.  Vomited yesterday.  Saw some possible reddish after. Mild abdominal pain,       RA - no medications , previous plaquenal   Pelvic pain  Denies:  dysuria, hematuria, vaginal discharge, fevers,         Nursing Notes were all reviewed and agreed with or any disagreements were addressed in the HPI.    REVIEW OF SYSTEMS :     Review of Systems    Pertinent positives and negatives are stated within HPI    PAST HISTORY   has a past medical history of Abnormal Pap smear of cervix, Anxiety, GERD (gastroesophageal reflux disease), Gestational hypertension, History of trichomoniasis, Migraine, Obesity, Pelvic pain, and Postpartum depression.    History reviewed. No pertinent surgical history.    Family History   Problem Relation Age of Onset    Stroke Maternal Grandmother     High Blood Pressure Maternal Grandmother     Breast Cancer Maternal Grandmother     Hypertension Maternal Grandmother     Cancer Maternal Grandfather     Diabetes Maternal Grandfather      lymph nodes noted on examination, but otherwise no evidence of acute abdominal pelvic etiology.  Given duration of symptoms and reassuring CT scan, and lab work of the lower suspicion for appendicitis, small bowel obstruction, bowel perforation, renal thrombosis, AAA, or any other acutely life-threatening abdominal pelvic etiology.  Do feel patient is safe for outpatient management return precautions.     (Small round low densities near cervix possible nabothian cyst; nodular densities on left buttock; left lung nodular densities; multiple abdominal small lymph nodes (may be normal or inflamation)     Condition is: mild stable acute illness    Discussion with Other Profesionals : None    Disposition Considerations (tests considered but not done, Shared Decision Making, Pt Expectation of Test or Tx.): none (unless indicated in ED Course, Summary, Reassessment, or MDM    Escalation of care, including admission/OBS considered : Appropriate for outpatient management.     Records Reviewed : None    Social Determinants of Health : None     Chronic conditions affecting care:    has a past medical history of Abnormal Pap smear of cervix, Anxiety, GERD (gastroesophageal reflux disease), Gestational hypertension, History of trichomoniasis, Migraine, Obesity, Pelvic pain, and Postpartum depression.    I am the Primary Clinician of Record.    Patient was given the following medications:  Medications   iopamidol (ISOVUE-370) 76 % injection 75 mL (75 mLs IntraVENous Given 2/7/25 2138)         Is this patient to be included in the SEP-1 Core Measure due to severe sepsis or septic shock?   No   Exclusion criteria - the patient is NOT to be included for SEP-1 Core Measure due to:  2+ SIRS criteria are not met    CRITICAL CARE TIME         PROCEDURES   Unless otherwise noted  none     Procedures      FINAL IMPRESSION      1. Left flank pain    2. Elevated alkaline phosphatase level          DISPOSITION/PLAN     DISPOSITION Decision

## 2025-02-08 NOTE — DISCHARGE INSTRUCTIONS
Contact your primary care doctor to schedule appointment next week for reevaluation of your pain, discuss your visit to the emergency department as well as your urine culture.    Additionally follow-up with your primary care provider to discuss the results of your blood work (elevated alkaline phosphatase ) CAT scan.  (Small round low densities near cervix possible nabothian cyst; nodular densities on left buttock; left lung nodular densities; multiple abdominal small lymph nodes (may be normal or inflamation)    Meanwhile would recommend using ibuprofen taken 600 mg every 6 hours.  If you need additional pain relief, you could use the Tylenol.    Return with any difficulty breathing, passing out, fevers, worsening abdominal pain, difficulty urinating, worsening symptoms or any new concerns

## 2025-02-28 ENCOUNTER — OFFICE VISIT (OUTPATIENT)
Dept: GASTROENTEROLOGY | Age: 35
End: 2025-02-28
Payer: MEDICAID

## 2025-02-28 ENCOUNTER — HOSPITAL ENCOUNTER (OUTPATIENT)
Age: 35
Discharge: HOME | End: 2025-02-28
Payer: MEDICAID

## 2025-02-28 VITALS
HEIGHT: 63 IN | RESPIRATION RATE: 16 BRPM | HEART RATE: 77 BPM | SYSTOLIC BLOOD PRESSURE: 122 MMHG | WEIGHT: 241.4 LBS | BODY MASS INDEX: 42.77 KG/M2 | DIASTOLIC BLOOD PRESSURE: 70 MMHG | OXYGEN SATURATION: 99 %

## 2025-02-28 DIAGNOSIS — R10.30 LOWER ABDOMINAL PAIN: ICD-10-CM

## 2025-02-28 DIAGNOSIS — R79.89 ELEVATED LFTS: ICD-10-CM

## 2025-02-28 DIAGNOSIS — K21.9 GASTROESOPHAGEAL REFLUX DISEASE WITHOUT ESOPHAGITIS: Primary | ICD-10-CM

## 2025-02-28 LAB
ALBUMIN SERPL-MCNC: 4 G/DL (ref 3.4–5)
ALBUMIN/GLOB SERPL: 1.3 {RATIO} (ref 1.1–2.2)
ALP SERPL-CCNC: 138 U/L (ref 40–129)
ALT SERPL-CCNC: 39 U/L (ref 10–40)
AST SERPL-CCNC: 28 U/L (ref 15–37)
BILIRUB DIRECT SERPL-MCNC: <0.2 MG/DL (ref 0–0.3)
BILIRUB INDIRECT SERPL-MCNC: ABNORMAL MG/DL (ref 0–0.7)
BILIRUB SERPL-MCNC: 0.4 MG/DL (ref 0–1)
PROT SERPL-MCNC: 7.1 G/DL (ref 6.4–8.2)

## 2025-02-28 PROCEDURE — 1036F TOBACCO NON-USER: CPT | Performed by: INTERNAL MEDICINE

## 2025-02-28 PROCEDURE — 36415 COLL VENOUS BLD VENIPUNCTURE: CPT

## 2025-02-28 PROCEDURE — 80076 HEPATIC FUNCTION PANEL: CPT

## 2025-02-28 PROCEDURE — 83516 IMMUNOASSAY NONANTIBODY: CPT

## 2025-02-28 PROCEDURE — 99214 OFFICE O/P EST MOD 30 MIN: CPT | Performed by: INTERNAL MEDICINE

## 2025-02-28 PROCEDURE — G8427 DOCREV CUR MEDS BY ELIG CLIN: HCPCS | Performed by: INTERNAL MEDICINE

## 2025-02-28 PROCEDURE — G8417 CALC BMI ABV UP PARAM F/U: HCPCS | Performed by: INTERNAL MEDICINE

## 2025-02-28 RX ORDER — METHOCARBAMOL 750 MG/1
750 TABLET, FILM COATED ORAL 3 TIMES DAILY
COMMUNITY
Start: 2025-02-11

## 2025-02-28 RX ORDER — TRANEXAMIC ACID 650 MG/1
TABLET ORAL
COMMUNITY
Start: 2025-01-23

## 2025-02-28 NOTE — PROGRESS NOTES
had a colonoscopy done 3 years ago at Dr. Dong office and she mentions that everything was normal.  Pain is currently 3/10 but at times can be 7/10. She mentions she has seen a gynecologist and she denies burning micturation.   Has been on elavil mentions does not feel its helping   CT abd pelvis in August noted to be normal then she presented to the ED more recently in [place] with concern for some lymphadenopathy retroperitoneal however not pathologic enlargement.   - NO B symptoms: No fever, chills or night sweats.   - levsin 0.125 mg PRN did not help   - Pain is now in the flank region and back, certain movements make it worse. Likely MSK.   - lidocaine patch did not help   - Voltaren gel     #2 rectal bleeding.   She mentions that rectal bleeding started a few weeks ago. It is intermittent.  - CBC normal   - Last colonoscopy 3 years ago per patient normal   - ?? Hemorrhoids   - Anusol cream   - Repeat colonoscopy if bleeding does not stop.   - Rectal: No fissure, Noted to have TTP to palpation anteriorly with hardness appreicated anterior to the rectum.   - I have counseled her to follow up with her Gynecologist and get a vaginal exam and a transvaginal US. She is currently being worked up by her Gynecologist for endometroisis as she only has rectal bleeding during the time of her periods.     #3 GERD   Discussed with the patient that they should eat small frequent meals, discussed about making a conscious effort of eating 1/4-1/2 of his portion and then eating the rest 2 to 3 hours later.  Also instructed to sit upright and eat his meals. Also recommended weight loss and healthy habits.  Also instructed to avoid eating 3 to 4 hours prior to sleeping.  She is currently taking TUMS PRN.     #4 elevated LFTs.  She was recently in the ED had mild elevation in alkaline phosphatase.  Will repeat LFTs again.  CT of the liver noted to be normal.  Check antimitochondrial antibody.    No follow-ups on file.    Jay Townsend,

## 2025-03-05 LAB — MITOCHONDRIA M2 IGG SER-ACNC: 3.6 UNITS (ref 0–24.9)

## 2025-05-10 ENCOUNTER — APPOINTMENT (OUTPATIENT)
Dept: GENERAL RADIOLOGY | Age: 35
End: 2025-05-10
Payer: MEDICAID

## 2025-05-10 ENCOUNTER — HOSPITAL ENCOUNTER (EMERGENCY)
Age: 35
Discharge: HOME | End: 2025-05-10
Attending: EMERGENCY MEDICINE
Payer: MEDICAID

## 2025-05-10 VITALS
SYSTOLIC BLOOD PRESSURE: 151 MMHG | OXYGEN SATURATION: 99 % | RESPIRATION RATE: 16 BRPM | BODY MASS INDEX: 46.25 KG/M2 | DIASTOLIC BLOOD PRESSURE: 74 MMHG | HEART RATE: 67 BPM | TEMPERATURE: 97.5 F | WEIGHT: 261 LBS | HEIGHT: 63 IN

## 2025-05-10 DIAGNOSIS — S62.001A CLOSED NONDISPLACED FRACTURE OF SCAPHOID OF RIGHT WRIST, UNSPECIFIED PORTION OF SCAPHOID, INITIAL ENCOUNTER: ICD-10-CM

## 2025-05-10 DIAGNOSIS — M77.11 LATERAL EPICONDYLITIS OF RIGHT ELBOW: Primary | ICD-10-CM

## 2025-05-10 PROCEDURE — 73080 X-RAY EXAM OF ELBOW: CPT

## 2025-05-10 PROCEDURE — 73110 X-RAY EXAM OF WRIST: CPT

## 2025-05-10 PROCEDURE — 6360000002 HC RX W HCPCS: Performed by: EMERGENCY MEDICINE

## 2025-05-10 PROCEDURE — 99283 EMERGENCY DEPT VISIT LOW MDM: CPT

## 2025-05-10 PROCEDURE — 6370000000 HC RX 637 (ALT 250 FOR IP): Performed by: EMERGENCY MEDICINE

## 2025-05-10 RX ORDER — DEXAMETHASONE 4 MG/1
8 TABLET ORAL ONCE
Status: COMPLETED | OUTPATIENT
Start: 2025-05-10 | End: 2025-05-10

## 2025-05-10 RX ORDER — NAPROXEN 500 MG/1
500 TABLET ORAL 2 TIMES DAILY WITH MEALS
Qty: 20 TABLET | Refills: 0 | Status: SHIPPED | OUTPATIENT
Start: 2025-05-10 | End: 2025-05-20

## 2025-05-10 RX ORDER — DEXAMETHASONE 4 MG/1
4 TABLET ORAL
Qty: 15 TABLET | Refills: 0 | Status: SHIPPED | OUTPATIENT
Start: 2025-05-10 | End: 2025-05-15

## 2025-05-10 RX ORDER — NAPROXEN 250 MG/1
500 TABLET ORAL ONCE
Status: COMPLETED | OUTPATIENT
Start: 2025-05-10 | End: 2025-05-10

## 2025-05-10 RX ADMIN — DEXAMETHASONE 8 MG: 4 TABLET ORAL at 10:42

## 2025-05-10 RX ADMIN — NAPROXEN 500 MG: 250 TABLET ORAL at 10:42

## 2025-05-10 ASSESSMENT — PAIN - FUNCTIONAL ASSESSMENT
PAIN_FUNCTIONAL_ASSESSMENT: ACTIVITIES ARE NOT PREVENTED
PAIN_FUNCTIONAL_ASSESSMENT: 0-10

## 2025-05-10 ASSESSMENT — PAIN SCALES - GENERAL
PAINLEVEL_OUTOF10: 5
PAINLEVEL_OUTOF10: 5

## 2025-05-10 ASSESSMENT — PAIN DESCRIPTION - ORIENTATION: ORIENTATION: RIGHT

## 2025-05-10 ASSESSMENT — PAIN DESCRIPTION - LOCATION: LOCATION: WRIST

## 2025-05-10 ASSESSMENT — PAIN DESCRIPTION - DESCRIPTORS: DESCRIPTORS: ACHING

## 2025-05-10 NOTE — ED PROVIDER NOTES
eMERGENCY dEPARTMENT eNCOUnter        CHIEF COMPLAINT    Chief Complaint   Patient presents with    Wrist Pain     Right wrist. States injured a month ago.        HPI    Hilaria Fonseca is a 34 y.o. female who presents with pain to the right wrist while she is working in the yard and lawnmowing and weeding.  She states that end of March that is about a month and a half ago she fell and landed on the right wrist she was not seen in the emergency department or anywhere complaining of pain in the middle dorsal aspect of the wrist in the  as well as radial styloid also feels tingling on the middle and ring finger on moving the right wrist.  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  She has not take any medication for it.  She continues to have this pain so she decided come to the emergency department [facility] and she was told that there is a long wait and they will not be able to do an x-ray  So she decided come to the emergency department.  No prior risk factors.  No other medical issues        See HPI for further details.  All other systems reviewed and are negative.    PAST MEDICAL & SURGICAL HISTORY    Past Medical History:   Diagnosis Date    Abnormal Pap smear of cervix     Anxiety     GERD (gastroesophageal reflux disease)     Gestational hypertension     History of trichomoniasis     Migraine     Obesity     Pelvic pain     Postpartum depression      History reviewed. No pertinent surgical history.    CURRENT MEDICATIONS    Current Outpatient Rx   Medication Sig Dispense Refill    dexAMETHasone (DECADRON) 4 MG tablet Take 1 tablet by mouth 3 times daily (with meals) for 5 days 15 tablet 0    naproxen (NAPROSYN) 500 MG tablet Take 1 tablet by mouth 2 times daily (with meals) for 20 doses 20 tablet 0    diclofenac sodium (VOLTAREN) 1 % GEL Apply 2 g topically 4 times daily 150 g 2    phentermine 37.5 MG capsule       escitalopram (LEXAPRO) 10 MG     VITAL SIGNS: BP (!) 151/74   Pulse 67   Temp 97.5 °F (36.4 °C) (Oral)   Resp 16   Ht 1.6 m (5' 3\")   Wt 118.4 kg (261 lb)   SpO2 99%   BMI 46.23 kg/m²   Constitutional:  Well developed, well nourished, no acute distress, non-toxic appearance   HENT:  Atraumatic, external ears normal, nose normal.  NECK- normal range of motion,  supple   Respiratory:  No respiratory distress,   Cardiovascular:  Normal rate,   Vascular: Normal perfusion  Musculoskeletal:      Examination right elbow there is a tenderness in the external epicondyle pain increases on supination of the wrist.  There is no swelling no deformity movements of the elbow are full range.  Examination of the right wrist there is no swelling no deformity there is a on palpation there is minimal tenderness in the middle of the dorsal aspect of the wrist and radial styloid.  Wrist movements are intact and full range.  Rest of the hand and fingers are nontender.    On tapping of the volar aspect of the wrist there is a subjective sensation of tingling on the middle and the ring finger.         Integument:  Well hydrated, no rash   Neurologic:  Awake alert, no slurred speech   Psychiatric: Cooperative, pleasant affect    RADIOLOGY/PROCEDURES    XR WRIST RIGHT (MIN 3 VIEWS)   Final Result      XR ELBOW RIGHT (MIN 3 VIEWS)   Final Result            ED COURSE & MEDICAL DECISION MAKING    Pertinent Labs & Imaging studies reviewed and interpreted. (See chart for details)    Vitals:    05/10/25 1028   BP: (!) 151/74   Pulse: 67   Resp: 16   Temp: 97.5 °F (36.4 °C)   TempSrc: Oral   SpO2: 99%   Weight: 118.4 kg (261 lb)   Height: 1.6 m (5' 3\")     The patient appears to have questionable scaphoid fracture seen only on a navicular view as she does have tenderness in the snuffbox area she is going to be given thumb spica Velcro splint and follow-up with orthopedics.  Regarding tennis elbow she will be given Decadron and regarding the pain should be given

## 2025-05-16 ENCOUNTER — OFFICE VISIT (OUTPATIENT)
Dept: GASTROENTEROLOGY | Age: 35
End: 2025-05-16
Payer: MEDICAID

## 2025-05-16 VITALS
BODY MASS INDEX: 46.6 KG/M2 | WEIGHT: 263 LBS | HEIGHT: 63 IN | OXYGEN SATURATION: 98 % | DIASTOLIC BLOOD PRESSURE: 78 MMHG | SYSTOLIC BLOOD PRESSURE: 118 MMHG | HEART RATE: 65 BPM | RESPIRATION RATE: 16 BRPM

## 2025-05-16 DIAGNOSIS — K62.5 RECTAL BLEEDING: ICD-10-CM

## 2025-05-16 DIAGNOSIS — R74.8 ELEVATED ALKALINE PHOSPHATASE LEVEL: Primary | ICD-10-CM

## 2025-05-16 DIAGNOSIS — R10.9 CHRONIC ABDOMINAL PAIN: ICD-10-CM

## 2025-05-16 DIAGNOSIS — G89.29 CHRONIC ABDOMINAL PAIN: ICD-10-CM

## 2025-05-16 DIAGNOSIS — K21.9 GASTROESOPHAGEAL REFLUX DISEASE WITHOUT ESOPHAGITIS: ICD-10-CM

## 2025-05-16 PROCEDURE — 1036F TOBACCO NON-USER: CPT | Performed by: NURSE PRACTITIONER

## 2025-05-16 PROCEDURE — 99214 OFFICE O/P EST MOD 30 MIN: CPT | Performed by: NURSE PRACTITIONER

## 2025-05-16 PROCEDURE — G8417 CALC BMI ABV UP PARAM F/U: HCPCS | Performed by: NURSE PRACTITIONER

## 2025-05-16 PROCEDURE — G2211 COMPLEX E/M VISIT ADD ON: HCPCS | Performed by: NURSE PRACTITIONER

## 2025-05-16 PROCEDURE — G8427 DOCREV CUR MEDS BY ELIG CLIN: HCPCS | Performed by: NURSE PRACTITIONER

## 2025-05-16 NOTE — PROGRESS NOTES
Hilaria Fonseca 34 y.o. female was seen by FERNY More on 5/16/2025     Wt Readings from Last 3 Encounters:   05/16/25 119.3 kg (263 lb)   05/10/25 118.4 kg (261 lb)   02/28/25 109.5 kg (241 lb 6.4 oz)       HPI  Hilaria Fonseca is a pleasant 34 y.o.  female who presents today for follow-up on chronic abdominal pain, GERD and elevated liver enzymes.  She reported no worsening symptoms.  She is currently following with OBGYN evaluating for possible endometriosis and trying to become pregnant.  She had prior EGD done by Dr. Luis on 9-.  Her colonoscopy was done by Dr. Luis three years ago with per patient record normal results.  Her LFTs showed elevation in alkaline phosphatase to 138 otherwise they were all normal.  Her appetite is good and weight is up.  She is trying to follow a plant based and low inflammatory diet due to her rheumatoid arthritis  No nausea or vomiting.  No current abdominal pain only mild discomfort.  Intermittent heartburn once a month.  Tums helps.  No nocturnal awakenings with acid reflux. No dysphagia or pain with swallowing.  She recalled having ongoing lower abdominal pain mainly in the suprapubic area. She mentions she has had intermittent episodes of pain for many years.  She mentions nothing seems to make it better, pain is worse with eating.  Pain is currently 3/10 but at times can be 7/10 with last episode of severe pain March 24 during her menstrual cycle.  Her CT of the abdomen/pelvis done on 2-7-2025 showed tiny nodularities in the left lung, multiple small lymph nodes in abdomen, and no acute abnormality otherwise noted in the abdomen or pelvis.  Her typical bowel pattern is every two days with small brown stools.  Some constipation.  No current diarrhea.  No blood in her stools or melena.  Her maternal and paternal grandfathers had colon cancer and maternal grandmother had colon cancer later on in life.       ROS  Review of Systems   Constitutional:

## 2025-05-17 ASSESSMENT — ENCOUNTER SYMPTOMS
CONSTIPATION: 0
SHORTNESS OF BREATH: 0
EYE PAIN: 0
BACK PAIN: 0
BLOOD IN STOOL: 0
DIARRHEA: 0
PHOTOPHOBIA: 0
VOMITING: 0
COUGH: 0
NAUSEA: 0
ABDOMINAL PAIN: 0
COLOR CHANGE: 0

## 2025-07-16 ENCOUNTER — HOSPITAL ENCOUNTER (OUTPATIENT)
Dept: INFUSION THERAPY | Age: 35
Discharge: HOME | End: 2025-07-16
Payer: MEDICAID

## 2025-07-16 DIAGNOSIS — D68.9 COAGULOPATHY: ICD-10-CM

## 2025-07-16 DIAGNOSIS — R04.0 NASAL BLEEDING: ICD-10-CM

## 2025-07-16 LAB
ALBUMIN SERPL-MCNC: 4.1 G/DL (ref 3.4–5)
ALBUMIN/GLOB SERPL: 1.4 {RATIO} (ref 1.1–2.2)
ALP SERPL-CCNC: 135 U/L (ref 40–129)
ALT SERPL-CCNC: 18 U/L (ref 10–40)
ANION GAP SERPL CALCULATED.3IONS-SCNC: 11 MMOL/L (ref 9–17)
AST SERPL-CCNC: 17 U/L (ref 15–37)
BASOPHILS # BLD: 0.04 K/UL
BASOPHILS NFR BLD: 1 % (ref 0–1)
BILIRUB SERPL-MCNC: 0.5 MG/DL (ref 0–1)
BUN SERPL-MCNC: 19 MG/DL (ref 7–20)
CALCIUM SERPL-MCNC: 9.4 MG/DL (ref 8.3–10.6)
CHLORIDE SERPL-SCNC: 104 MMOL/L (ref 99–110)
CO2 SERPL-SCNC: 22 MMOL/L (ref 21–32)
CREAT SERPL-MCNC: 0.8 MG/DL (ref 0.6–1.1)
EOSINOPHIL # BLD: 0.09 K/UL
EOSINOPHILS RELATIVE PERCENT: 2 % (ref 0–3)
ERYTHROCYTE [DISTWIDTH] IN BLOOD BY AUTOMATED COUNT: 13.7 % (ref 11.7–14.9)
FERRITIN SERPL-MCNC: 49 NG/ML (ref 15–150)
FOLATE SERPL-MCNC: 32.6 NG/ML (ref 4.8–24.2)
GFR, ESTIMATED: 83 ML/MIN/1.73M2
GLUCOSE SERPL-MCNC: 104 MG/DL (ref 74–99)
HCT VFR BLD AUTO: 39.3 % (ref 37–47)
HGB BLD-MCNC: 12.8 G/DL (ref 12.5–16)
IRON SATN MFR SERPL: 17 % (ref 15–50)
IRON SERPL-MCNC: 56 UG/DL (ref 37–145)
LYMPHOCYTES NFR BLD: 1.45 K/UL
LYMPHOCYTES RELATIVE PERCENT: 26 % (ref 24–44)
MCH RBC QN AUTO: 29.2 PG (ref 27–31)
MCHC RBC AUTO-ENTMCNC: 32.6 G/DL (ref 32–36)
MCV RBC AUTO: 89.5 FL (ref 78–100)
MONOCYTES NFR BLD: 0.48 K/UL
MONOCYTES NFR BLD: 9 % (ref 0–5)
NEUTROPHILS NFR BLD: 63 % (ref 36–66)
NEUTS SEG NFR BLD: 3.44 K/UL
PLATELET # BLD AUTO: 265 K/UL (ref 140–440)
PMV BLD AUTO: 10.3 FL (ref 7.5–11.1)
POTASSIUM SERPL-SCNC: 3.8 MMOL/L (ref 3.5–5.1)
PROT SERPL-MCNC: 7 G/DL (ref 6.4–8.2)
RBC # BLD AUTO: 4.39 M/UL (ref 4.2–5.4)
SODIUM SERPL-SCNC: 138 MMOL/L (ref 136–145)
TIBC SERPL-MCNC: 332 UG/DL (ref 260–445)
UNSATURATED IRON BINDING CAPACITY: 276 UG/DL (ref 110–370)
VIT B12 SERPL-MCNC: 494 PG/ML (ref 211–911)
WBC OTHER # BLD: 5.5 K/UL (ref 4–10.5)

## 2025-07-16 PROCEDURE — 80053 COMPREHEN METABOLIC PANEL: CPT

## 2025-07-16 PROCEDURE — 85025 COMPLETE CBC W/AUTO DIFF WBC: CPT

## 2025-07-16 PROCEDURE — 36415 COLL VENOUS BLD VENIPUNCTURE: CPT

## 2025-07-16 PROCEDURE — 83550 IRON BINDING TEST: CPT

## 2025-07-16 PROCEDURE — 82607 VITAMIN B-12: CPT

## 2025-07-16 PROCEDURE — 82746 ASSAY OF FOLIC ACID SERUM: CPT

## 2025-07-16 PROCEDURE — 82728 ASSAY OF FERRITIN: CPT

## 2025-07-16 PROCEDURE — 83540 ASSAY OF IRON: CPT

## 2025-07-23 ENCOUNTER — OFFICE VISIT (OUTPATIENT)
Dept: ONCOLOGY | Age: 35
End: 2025-07-23
Payer: MEDICAID

## 2025-07-23 ENCOUNTER — HOSPITAL ENCOUNTER (OUTPATIENT)
Dept: INFUSION THERAPY | Age: 35
Discharge: HOME | End: 2025-07-23
Payer: MEDICAID

## 2025-07-23 VITALS
SYSTOLIC BLOOD PRESSURE: 115 MMHG | HEART RATE: 80 BPM | WEIGHT: 271.6 LBS | OXYGEN SATURATION: 96 % | DIASTOLIC BLOOD PRESSURE: 76 MMHG | HEIGHT: 63 IN | TEMPERATURE: 98.7 F | BODY MASS INDEX: 48.12 KG/M2 | RESPIRATION RATE: 18 BRPM

## 2025-07-23 DIAGNOSIS — R04.0 NASAL BLEEDING: ICD-10-CM

## 2025-07-23 DIAGNOSIS — D68.9 COAGULOPATHY: Primary | ICD-10-CM

## 2025-07-23 PROCEDURE — 99212 OFFICE O/P EST SF 10 MIN: CPT

## 2025-07-23 PROCEDURE — G8417 CALC BMI ABV UP PARAM F/U: HCPCS | Performed by: INTERNAL MEDICINE

## 2025-07-23 PROCEDURE — G8427 DOCREV CUR MEDS BY ELIG CLIN: HCPCS | Performed by: INTERNAL MEDICINE

## 2025-07-23 PROCEDURE — 99213 OFFICE O/P EST LOW 20 MIN: CPT | Performed by: INTERNAL MEDICINE

## 2025-07-23 PROCEDURE — 1036F TOBACCO NON-USER: CPT | Performed by: INTERNAL MEDICINE

## 2025-07-23 ASSESSMENT — PATIENT HEALTH QUESTIONNAIRE - PHQ9
SUM OF ALL RESPONSES TO PHQ QUESTIONS 1-9: 2
SUM OF ALL RESPONSES TO PHQ QUESTIONS 1-9: 2
1. LITTLE INTEREST OR PLEASURE IN DOING THINGS: SEVERAL DAYS
2. FEELING DOWN, DEPRESSED OR HOPELESS: SEVERAL DAYS
SUM OF ALL RESPONSES TO PHQ QUESTIONS 1-9: 2
SUM OF ALL RESPONSES TO PHQ QUESTIONS 1-9: 2

## 2025-07-23 NOTE — PROGRESS NOTES
MA/LPN Rooming Questions  Patient: Hilaria Fonseca  MRN: [id number]    Date: 7/23/2025        1. Do you have any new issues?   no         2. Do you need any refills on medications?    no    3. Have you had any imaging done since your last visit?   Yes-XR, CT    4. Have you been hospitalized or seen in the emergency room since your last visit here?   no    5. Did the patient have a depression screening completed today? Yes    No data recorded     PHQ-9 Given to (if applicable):               PHQ-9 Score (if applicable):                     [] Positive     []  Negative              Does question #9 need addressed (if applicable)                     [] Yes    []  No               Priya Dumont CMA

## 2025-07-23 NOTE — PROGRESS NOTES
Patient Name:  Hilaria Fonseca  Patient :  1990  Patient MRN:  [id number]     Primary Oncologist: Janet Luis MD  Referring Provider: Mariela Jurado APRN - CNP     Date of Service: 2025      Reason for Consult: Excess nosebleed     Chief Complaint:    Chief Complaint   Patient presents with    Follow-up       Encounter Diagnoses   Name Primary?    Coagulopathy Yes    Nasal bleeding           HPI:  2024: Very pleasant 34-year-old female arrived with her daughter to the clinic today.  Reported that she has been bruising very easily and even spontaneously.  Reported chronic history of nosebleeds, occurs every 2 days and lasts all day.  Reported gingival bleeding as well.  Reported rectal bleeding for the past month or so.  Reported heavy menstrual bleeding for the past several years, associated with clots.  Denies any  bleeding.  Denies any weight loss.  No fever or lymphadenopathy.  Denying chest pain or increased shortness of breath.  Reported lower abdominal pain.  She is being evaluated by GI.    2024 CBC with WBC of 7.6 hemoglobin of 13.2 hematocrit 40, MCV 86.8 and platelets of 338 C-reactive protein is 1.19 rheumatoid factor 18 liver function tests within normal limits uric acid 4.1 creatinine of 0.8 sed rate 43 vitamin D 23 CCP less than 20 hepatitis panel acute negative QuantiFERON gold negative    24: Ct abdomen and pelvis:IMPRESSION:  1. Normal appendix  2. Mild to moderate degree of stool  3. No acute findings    2024, CBC, CMP, TSH, PT, PTT, von Willebrand panel in the serum protein electrophoresis all within normal limits    25 CBC with WBC of 5.5, HGB of 12.8, HCT of 39.3, MCV of 89.5, platelets of 259. CMP with BUN of 24, but otherwise wnl. Von Willebrand factor with factor VIII activity of 230, and the rest are wnl.     2025 CBC within normal limits CMP with alk phos 135 ferritin of 49 iron saturations of 17% B12 494 folic acid 32.9     Past Medical

## 2025-08-28 ENCOUNTER — TELEPHONE (OUTPATIENT)
Dept: GASTROENTEROLOGY | Age: 35
End: 2025-08-28